# Patient Record
Sex: MALE | Race: WHITE | NOT HISPANIC OR LATINO | Employment: FULL TIME | URBAN - METROPOLITAN AREA
[De-identification: names, ages, dates, MRNs, and addresses within clinical notes are randomized per-mention and may not be internally consistent; named-entity substitution may affect disease eponyms.]

---

## 2017-01-04 DIAGNOSIS — E78.5 HYPERLIPIDEMIA: ICD-10-CM

## 2017-01-04 DIAGNOSIS — I25.10 ATHEROSCLEROTIC HEART DISEASE OF NATIVE CORONARY ARTERY WITHOUT ANGINA PECTORIS: ICD-10-CM

## 2017-01-04 DIAGNOSIS — I10 ESSENTIAL (PRIMARY) HYPERTENSION: ICD-10-CM

## 2017-01-04 DIAGNOSIS — Z12.5 ENCOUNTER FOR SCREENING FOR MALIGNANT NEOPLASM OF PROSTATE: ICD-10-CM

## 2017-01-04 DIAGNOSIS — R42 DIZZINESS AND GIDDINESS: ICD-10-CM

## 2017-01-10 ENCOUNTER — ALLSCRIPTS OFFICE VISIT (OUTPATIENT)
Dept: OTHER | Facility: OTHER | Age: 57
End: 2017-01-10

## 2017-01-11 ENCOUNTER — APPOINTMENT (OUTPATIENT)
Dept: LAB | Facility: MEDICAL CENTER | Age: 57
End: 2017-01-11
Payer: COMMERCIAL

## 2017-01-11 ENCOUNTER — TRANSCRIBE ORDERS (OUTPATIENT)
Dept: LAB | Facility: MEDICAL CENTER | Age: 57
End: 2017-01-11

## 2017-01-11 DIAGNOSIS — E78.5 HYPERLIPIDEMIA: ICD-10-CM

## 2017-01-11 DIAGNOSIS — Z12.5 ENCOUNTER FOR SCREENING FOR MALIGNANT NEOPLASM OF PROSTATE: ICD-10-CM

## 2017-01-11 DIAGNOSIS — I25.10 ATHEROSCLEROTIC HEART DISEASE OF NATIVE CORONARY ARTERY WITHOUT ANGINA PECTORIS: ICD-10-CM

## 2017-01-11 DIAGNOSIS — I10 ESSENTIAL (PRIMARY) HYPERTENSION: ICD-10-CM

## 2017-01-11 LAB
25(OH)D3 SERPL-MCNC: 23.3 NG/ML (ref 30–100)
ALBUMIN SERPL BCP-MCNC: 4.1 G/DL (ref 3.5–5)
ALP SERPL-CCNC: 75 U/L (ref 46–116)
ALT SERPL W P-5'-P-CCNC: 36 U/L (ref 12–78)
ANION GAP SERPL CALCULATED.3IONS-SCNC: 6 MMOL/L (ref 4–13)
AST SERPL W P-5'-P-CCNC: 23 U/L (ref 5–45)
BILIRUB SERPL-MCNC: 0.51 MG/DL (ref 0.2–1)
BUN SERPL-MCNC: 13 MG/DL (ref 5–25)
CALCIUM SERPL-MCNC: 9 MG/DL (ref 8.3–10.1)
CHLORIDE SERPL-SCNC: 105 MMOL/L (ref 100–108)
CHOLEST SERPL-MCNC: 143 MG/DL (ref 50–200)
CO2 SERPL-SCNC: 29 MMOL/L (ref 21–32)
CREAT SERPL-MCNC: 1.03 MG/DL (ref 0.6–1.3)
GFR SERPL CREATININE-BSD FRML MDRD: >60 ML/MIN/1.73SQ M
GLUCOSE SERPL-MCNC: 94 MG/DL (ref 65–140)
HDLC SERPL-MCNC: 45 MG/DL (ref 40–60)
LDLC SERPL CALC-MCNC: 73 MG/DL (ref 0–100)
MAGNESIUM SERPL-MCNC: 2.3 MG/DL (ref 1.6–2.6)
POTASSIUM SERPL-SCNC: 3.9 MMOL/L (ref 3.5–5.3)
PROT SERPL-MCNC: 7.4 G/DL (ref 6.4–8.2)
PSA SERPL-MCNC: 0.5 NG/ML (ref 0–4)
SODIUM SERPL-SCNC: 140 MMOL/L (ref 136–145)
TRIGL SERPL-MCNC: 127 MG/DL
TSH SERPL DL<=0.05 MIU/L-ACNC: 1.75 UIU/ML (ref 0.36–3.74)

## 2017-01-11 PROCEDURE — 82306 VITAMIN D 25 HYDROXY: CPT

## 2017-01-11 PROCEDURE — G0103 PSA SCREENING: HCPCS

## 2017-01-11 PROCEDURE — 36415 COLL VENOUS BLD VENIPUNCTURE: CPT

## 2017-01-11 PROCEDURE — 80053 COMPREHEN METABOLIC PANEL: CPT

## 2017-01-11 PROCEDURE — 80061 LIPID PANEL: CPT

## 2017-01-11 PROCEDURE — 84443 ASSAY THYROID STIM HORMONE: CPT

## 2017-01-11 PROCEDURE — 83735 ASSAY OF MAGNESIUM: CPT

## 2017-02-02 ENCOUNTER — HOSPITAL ENCOUNTER (OUTPATIENT)
Dept: ULTRASOUND IMAGING | Facility: HOSPITAL | Age: 57
Discharge: HOME | End: 2017-02-02
Attending: INTERNAL MEDICINE
Payer: COMMERCIAL

## 2017-02-02 ENCOUNTER — TRANSCRIBE ORDERS (OUTPATIENT)
Dept: ADMINISTRATIVE | Facility: HOSPITAL | Age: 57
End: 2017-02-02

## 2017-02-02 ENCOUNTER — ALLSCRIPTS OFFICE VISIT (OUTPATIENT)
Dept: OTHER | Facility: OTHER | Age: 57
End: 2017-02-02

## 2017-02-02 DIAGNOSIS — R42 DIZZINESS AND GIDDINESS: Primary | ICD-10-CM

## 2017-02-02 DIAGNOSIS — I25.10 ATHEROSCLEROTIC HEART DISEASE OF NATIVE CORONARY ARTERY WITHOUT ANGINA PECTORIS: ICD-10-CM

## 2017-02-02 PROCEDURE — 93880 EXTRACRANIAL BILAT STUDY: CPT

## 2017-02-17 ENCOUNTER — HOSPITAL ENCOUNTER (OUTPATIENT)
Dept: CT IMAGING | Facility: HOSPITAL | Age: 57
Discharge: HOME | End: 2017-02-17
Attending: INTERNAL MEDICINE
Payer: COMMERCIAL

## 2017-02-17 DIAGNOSIS — R42 DIZZINESS AND GIDDINESS: ICD-10-CM

## 2017-02-17 PROCEDURE — 70496 CT ANGIOGRAPHY HEAD: CPT

## 2017-02-17 PROCEDURE — 70498 CT ANGIOGRAPHY NECK: CPT

## 2017-02-17 RX ADMIN — IOHEXOL 100 ML: 350 INJECTION, SOLUTION INTRAVENOUS at 12:44

## 2017-07-18 ENCOUNTER — ALLSCRIPTS OFFICE VISIT (OUTPATIENT)
Dept: OTHER | Facility: OTHER | Age: 57
End: 2017-07-18

## 2017-07-18 DIAGNOSIS — I10 ESSENTIAL (PRIMARY) HYPERTENSION: ICD-10-CM

## 2017-07-18 DIAGNOSIS — E78.5 HYPERLIPIDEMIA: ICD-10-CM

## 2017-07-18 DIAGNOSIS — Z12.5 ENCOUNTER FOR SCREENING FOR MALIGNANT NEOPLASM OF PROSTATE: ICD-10-CM

## 2017-11-02 ENCOUNTER — ALLSCRIPTS OFFICE VISIT (OUTPATIENT)
Dept: OTHER | Facility: OTHER | Age: 57
End: 2017-11-02

## 2017-11-02 DIAGNOSIS — I83.90 ASYMPTOMATIC VARICOSE VEINS OF LOWER EXTREMITY: ICD-10-CM

## 2017-11-03 NOTE — PROGRESS NOTES
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)  3  Dermatitis (692 9) (L30 9)    Plan  Dermatitis    · Start: Nystatin-Triamcinolone 374410-3 9 UNIT/GM-% External Cream; APPLY TO AFFECTED AREA  TWICE A DAY  Varicosities of leg    · VAS REFLUX LOWER LIMB   ; Unilateral Side:Left; Status:Hold For - Scheduling; Requested  VJU:[id number];     Discussion/Summary  Discussion Summary:   Vascular study lle  Support stockings triamcinolone cream and moisturizer  Increase fluids, elevate left leg at rest 1        Medication SE Review and Pt Understands Tx: Possible side effects of new medications were reviewed with the patient/guardian today  The treatment plan was reviewed with the patient/guardian  The patient/guardian understands and agrees with the treatment plan       Self Referrals:   Self Referrals: No       1 Amended By: Lexy Dumont; Nov 02 2017 9:25 PM EST    Chief Complaint  Chief Complaint Free Text Note Form: Pt presents for f/up exam for Left ankle pain  Pt has c/o Left ankle pain, swelling and bruising for the past 2 months  Denies injury  No falls  NO refills needed today  appetite is normal per patient  Chief Complaint Chronic Condition [facility]: Patient is here today for follow up of chronic conditions described in HPI  History of Present Illness  HPI: Pt has redness and discoloration left ankle  Has varicose veins in back of leg  No pain  Some ankle pain but no trauma1        1 Amended By: Lexy Dumont; Nov 02 2017 9:22 PM EST    Review of Systems  Complete-Male:   Constitutional:1  No fever or chills, feels well, no tiredness, no recent weight gain or weight loss1 ,-- no fever1 -- and-- no chills1   Eyes:1  No complaints of eye pain, no red eyes, no discharge from eyes, no itchy eyes1   ENT:1  no complaints of earache, no hearing loss, no nosebleeds, no nasal discharge, no sore throat, no hoarseness1      Cardiovascular: 1  No complaints of slow heart rate, no fast heart rate, no chest pain, no palpitations, no leg claudication, no lower extremity1   Respiratory:1  No complaints of shortness of breath, no wheezing, no cough, no SOB on exertion, no orthopnea or PND1   Gastrointestinal:1  No complaints of abdominal pain, no constipation, no nausea or vomiting, no diarrhea or bloody stools1   Genitourinary:1  No complaints of dysuria, no incontinence, no hesitancy, no nocturia, no genital lesion, no testicular pain1   Musculoskeletal:1  joint swelling1 -- and-- joint stiffness1   Integumentary:1  dry skin1   Neurological:1  No compliants of headache, no confusion, no convulsions, no numbness or tingling, no dizziness or fainting, no limb weakness, no difficulty walking1 ,-- no numbness1 -- and-- no tingling1   Psychiatric:1  Is not suicidal, no sleep disturbances, no anxiety or depression, no change in personality, no emotional problems1   Endocrine:1  No complaints of proptosis, no hot flashes, no muscle weakness, no erectile dysfunction, no deepening of the voice, no feelings of weakness1   Hematologic/Lymphatic:1  No complaints of swollen glands, no swollen glands in the neck, does not bleed easily, no easy bruising1         1 Amended By: Dottie James; Nov 02 2017 9:22 PM EST    Active Problems  1  Arteriosclerosis of coronary artery (414 00) (I25 10)  2  Dyslipidemia (272 4) (E78 5)  3  Elbow pain, unspecified laterality (719 42) (M25 529)  4  Encounter for special screening examination for neoplasm of prostate (V76 44) (Z12 5)  5  Erectile dysfunction of non-organic origin (302 72) (F52 21)  6  GERD without esophagitis (530 81) (K21 9)  7  Hypertension (401 9) (I10)  8  Lightheadedness (780 4) (R42)  9  Near syncope (780 2) (R55)  10  Spondylosis of cervical region without myelopathy or radiculopathy (721 0) (F02 022)    Past Medical History  1  History of Acute Myocardial Infarction (V12 59)  2   History of low back pain (V13 59) (Z87 39)  3  History of Polyp of sigmoid colon (211 3) (D12 5)  4  History of Uncomplicated alcohol abuse (305 00) (F10 10)  Active Problems And Past Medical History Reviewed: The active problems and past medical history were reviewed and updated today  Surgical History  1  History of Cath Stent Placement  2  History of Inguinal Hernia Repair  Surgical History Reviewed: The surgical history was reviewed and updated today  Family History  Mother   1  Family history of Angina Pectoris  Father   2  Family history of Prostate Cancer (V16 42)  Maternal Grandmother   3  Family history of Angina Pectoris  Family History   4  Denied: Family history of Drug abuse  Family History Reviewed: The family history was reviewed and updated today  Social History   · Caffeine use (V49 89) (F15 90)   · Denied: History of Drug Use   · Former smoker (V15 82) (D02 938)   · Marital History - Currently    · Denied: Seeing a dentist   · Stopped Drinking Alcohol   · Uses Safety Equipment - Seatbelts  Social History Reviewed: The social history was reviewed and updated today  The social history was reviewed and is unchanged  Current Meds  1  Aspirin 81 MG TABS; TAKE 1 TABLET DAILY; Therapy: (Recorded:42Axe4734) to Recorded  2  Atorvastatin Calcium 40 MG Oral Tablet; Take 1 tablet daily; Therapy: 19ETH7235 to (Last Rx:08Eli3885)  Requested for: 49Vmh0001 Ordered  3  Cialis 20 MG Oral Tablet; TAKE 1 TABLET 1 HOUR BEFORE ACTIVITY AS NEEDED; Therapy: 66DSH4954 to (Evaluate:59Zjx8198); Last Rx:50Pwg4656 Ordered  4  Garlic CAPS; Therapy: (Recorded:19Jun2012) to Recorded  5  Metoprolol Succinate ER 50 MG Oral Tablet Extended Release 24 Hour; Take 1 tablet daily; Therapy: 23WGF0438 to (Last [facility])  Requested for: 61EMS1176 Ordered  6  Multiple Vitamin TABS; Therapy: (Recorded:19Jun2012) to Recorded  7  Niacin 500 MG Oral Tablet; TAKE 1 TABLET DAILY AS DIRECTED;    Therapy: (Eino Blend) to Recorded  8  Nitrostat 0 4 MG Sublingual Tablet Sublingual; PLACE 1 TABLET UNDER THE TONGUE EVERY 5   MINUTES FOR UP TO 3 DOSES AS NEEDED FOR CHEST PAIN  CALL 911 IF PAIN PERSISTS; Therapy: 54ZTV0922 to (Evaluate:18Apr2016)  Requested for: 10JHT5556; Last AO:26JNI7019   Ordered  9  RaNITidine HCl - 150 MG Oral Tablet; take 1 tablet twice a day; Therapy: 89EWL9508 to (Last Rx:70Oto0985)  Requested for: 20Ptr2990 Ordered  10  Vitamin C TABS; Therapy: (Recorded:19Jun2012) to Recorded  Medication List Reviewed: The medication list was reviewed and updated today  Allergies  1  Wellbutrin SR TBCR    Vitals  Vital Signs    Recorded: [id number] 04:40PM Recorded: 47FOF2704 04:15PM   Temperature  96 9 F, Tympanic   Heart Rate  78   Systolic 393    Diastolic 72    Height  5 ft 9 in   Weight  198 lb 8 oz   BMI Calculated  29 31   BSA Calculated  2 06   O2 Saturation  97, RA     Physical Exam    Constitutional1    General appearance: No acute distress, well appearing and well nourished  1    Eyes1    Conjunctiva and lids: No swelling, erythema, or discharge  1    Pupils and irises: Equal, round and reactive to light  1    Ears, Nose, Mouth, and Throat1    External inspection of ears and nose: Normal 1    Otoscopic examination: Tympanic membrance translucent with normal light reflex  Canals patent without erythema  1    Nasal mucosa, septum, and turbinates: Normal without edema or erythema  1    Oropharynx: Normal with no erythema, edema, exudate or lesions  1    Pulmonary1    Respiratory effort: No increased work of breathing or signs of respiratory distress  1    Auscultation of lungs: Clear to auscultation, equal breath sounds bilaterally, no wheezes, no rales, no rhonci  1    Cardiovascular1    Palpation of heart: Normal PMI, no thrills  1    Auscultation of heart: Normal rate and rhythm, normal S1 and S2, without murmurs  1    Examination of extremities for edema and/or varicosities: Abnormal  1 -- Left varicose veins and varicose pooling1   Carotid pulses: Normal 1    Abdomen1    Abdomen: Non-tender, no masses  1    Liver and spleen: No hepatomegaly or splenomegaly  1    Lymphatic1    Palpation of lymph nodes in neck: No lymphadenopathy  1    Musculoskeletal1    Gait and station: Normal 1    Digits and nails: Normal without clubbing or cyanosis  1    Inspection/palpation of joints, bones, and muscles: Normal 1    Skin   Skin and subcutaneous tissue: Abnormal  1 -- Dry skin lesion left ankle1   Neurologic1    Cranial nerves: Cranial nerves 2-12 intact  1    Reflexes: 2+ and symmetric  1    Sensation: Abnormal  1 -- 4/51   Psychiatric1    Orientation to person, place and time: Normal 1    Mood and affect: Normal 1          1 Amended By: Rc Mendosa; Nov 02 2017 9:24 PM EST    Health Management  History of Encounter for screening colonoscopy   Health Maintenance Flow Sheet; every 4 years; Last 49YYP3984; Next Due: 88Vzm8711;  Active    Signatures   Electronically signed by : Nayeli Mccabe DO; Nov 2 2017  4:44PM EST                       (Author)    Electronically signed by : Nayeli Mccabe DO; Nov 2 2017  9:25PM EST                       (Author)

## 2017-11-06 ENCOUNTER — TRANSCRIBE ORDERS (OUTPATIENT)
Dept: ADMINISTRATIVE | Facility: HOSPITAL | Age: 57
End: 2017-11-06

## 2017-11-06 ENCOUNTER — APPOINTMENT (OUTPATIENT)
Dept: LAB | Facility: HOSPITAL | Age: 57
End: 2017-11-06
Attending: INTERNAL MEDICINE
Payer: COMMERCIAL

## 2017-11-06 ENCOUNTER — HOSPITAL ENCOUNTER (OUTPATIENT)
Dept: ULTRASOUND IMAGING | Facility: HOSPITAL | Age: 57
Discharge: HOME | End: 2017-11-06
Attending: INTERNAL MEDICINE
Payer: COMMERCIAL

## 2017-11-06 DIAGNOSIS — E78.5 HYPERLIPIDEMIA: ICD-10-CM

## 2017-11-06 DIAGNOSIS — I83.90 ASYMPTOMATIC VARICOSE VEINS OF LOWER EXTREMITY: ICD-10-CM

## 2017-11-06 DIAGNOSIS — I10 ESSENTIAL (PRIMARY) HYPERTENSION: ICD-10-CM

## 2017-11-06 DIAGNOSIS — Z12.5 ENCOUNTER FOR SCREENING FOR MALIGNANT NEOPLASM OF PROSTATE: ICD-10-CM

## 2017-11-06 LAB
ALBUMIN SERPL BCP-MCNC: 3.9 G/DL (ref 3.5–5)
ALP SERPL-CCNC: 68 U/L (ref 46–116)
ALT SERPL W P-5'-P-CCNC: 47 U/L (ref 12–78)
ANION GAP SERPL CALCULATED.3IONS-SCNC: 7 MMOL/L (ref 4–13)
AST SERPL W P-5'-P-CCNC: 29 U/L (ref 5–45)
BILIRUB SERPL-MCNC: 0.6 MG/DL (ref 0.2–1)
BUN SERPL-MCNC: 15 MG/DL (ref 5–25)
CALCIUM SERPL-MCNC: 9.1 MG/DL (ref 8.3–10.1)
CHLORIDE SERPL-SCNC: 104 MMOL/L (ref 100–108)
CHOLEST SERPL-MCNC: 139 MG/DL (ref 50–200)
CO2 SERPL-SCNC: 31 MMOL/L (ref 21–32)
CREAT SERPL-MCNC: 1.03 MG/DL (ref 0.6–1.3)
GFR SERPL CREATININE-BSD FRML MDRD: 79 ML/MIN/1.73SQ M
GLUCOSE P FAST SERPL-MCNC: 105 MG/DL (ref 65–99)
HDLC SERPL-MCNC: 50 MG/DL (ref 40–60)
LDLC SERPL CALC-MCNC: 65 MG/DL (ref 0–100)
POTASSIUM SERPL-SCNC: 4.2 MMOL/L (ref 3.5–5.3)
PROT SERPL-MCNC: 6.6 G/DL (ref 6.4–8.2)
PSA SERPL-MCNC: 0.6 NG/ML (ref 0–4)
SODIUM SERPL-SCNC: 142 MMOL/L (ref 136–145)
TRIGL SERPL-MCNC: 118 MG/DL

## 2017-11-06 PROCEDURE — 80053 COMPREHEN METABOLIC PANEL: CPT

## 2017-11-06 PROCEDURE — 80061 LIPID PANEL: CPT

## 2017-11-06 PROCEDURE — 93971 EXTREMITY STUDY: CPT

## 2017-11-06 PROCEDURE — 36415 COLL VENOUS BLD VENIPUNCTURE: CPT

## 2017-11-06 PROCEDURE — G0103 PSA SCREENING: HCPCS

## 2018-01-12 NOTE — PROGRESS NOTES
Assessment  Assessed    1  CAD (coronary artery disease) (414 00) (I25 10)   2  History of Cath Stent Placement   3  Hypertension (401 9) (I10)   4  Dyslipidemia (272 4) (E78 5)    Plan  CAD (coronary artery disease), Dyslipidemia, Hypertension    · Follow-up visit in 1 year Evaluation and Treatment  Follow-up  Status: Hold For -  Scheduling  Requested for: 26Jan2016   Ordered; For: CAD (coronary artery disease), Dyslipidemia, Hypertension; Ordered By: Carlos Mcdonald Performed:  Due: 30YOC3856    Discussion/Summary  Discussion Summary:   Mr German Amaya is a pleasant 57-year-old male who presents to the office for routine follow-up  Since hospital discharge, he has been feeling well  He is asymptomatic and without complaints today  His blood pressure is well-controlled in the office today  His lipids drawn in December are acceptable on current therapy  No changes were made to his medication regimen  No testing is indicated given his lack of symptoms  His recent nuclear stress test was relatively unrevealing  I will see him back in the office in one year  History of Present Illness  HPI: Mr German Amaya is a pleasant 57-year-old male who presents to the office today for routine follow-up  Since his last visit, he has been feeling well  However he was hospitalized in December with atypical chest pain  He underwent a stress test during which he exercised for 12 minutes without any symptoms with nuclear perfusion imaging which was relatively unrevealing  He continues to dance regularly performing various styles of dancing  He does so without chest pain or shortness of breath  He denies symptoms of heart failure  He denies lightheadedness, syncope or presyncope  He denies symptoms of palpitations or claudication  Review of Systems  Cardiology Male ROS:     Cardiac: as noted in HPI  Skin: No complaints of nonhealing sores or skin rash     Genitourinary: No complaints of recurrent urinary tract infections, frequent urination at night, difficult urination, blood in urine, kidney stones, loss of bladder control, no kidney or prostate problems, no erectile dysfunction  Psychological: No complaints of feeling depressed, anxiety, panic attacks, or difficulty concentrating  General: No complaints of trouble sleeping, lack of energy, fatigue, appetite changes, weight changes, fever, frequent infections, or night sweats  Respiratory: No complaints of shortness of breath, cough with sputum, or wheezing  HEENT: No complaints of serious problems, hearing problems, nose problems, throat problems, or snoring  Gastrointestinal: No complaints of liver problems, nausea, vomiting, heartburn, constipation, bloody stools, diarrhea, problems swallowing, adbominal pain, or rectal bleeding  Hematologic: No complaints of bleeding disorders, anemia, blood clots, or excessive brusing  Neurological: No complaints of numbness, tingling, dizziness, weakness, seizures, headaches, syncope or fainting, AM fatigue, daytime sleepiness, no witnessed apnea episodes  Musculoskeletal: No complaints of arthritis, back pain, or painfull swelling  Active Problems  Problems    1  CAD (coronary artery disease) (414 00) (I25 10)   2  Dyslipidemia (272 4) (E78 5)   3  Elbow pain, unspecified laterality (719 42) (M25 529)   4  Erectile dysfunction of non-organic origin (302 72) (F52 21)   5  GERD without esophagitis (530 81) (K21 9)   6  Hypertension (401 9) (I10)   7  Spondylosis of cervical region without myelopathy or radiculopathy (721 0) (X10 341)    Past Medical History  Problems    1  History of Acute bronchitis (466 0) (J20 9)   2  History of Acute Myocardial Infarction (V12 59)   3  History of Encounter for prostate cancer screening (V76 44) (Z12 5)   4  History of Encounter for screening colonoscopy (V76 51) (Z12 11)   5  History of low back pain (V13 59) (Z87 39)   6  History of Polyp of sigmoid colon (211 3) (D12 5)   7   History of Uncomplicated alcohol abuse (305 00) (F10 10)  Active Problems And Past Medical History Reviewed: The active problems and past medical history were reviewed and updated today  Surgical History  Problems    1  History of Cath Stent Placement   2  History of Inguinal Hernia Repair  Surgical History Reviewed: The surgical history was reviewed and updated today  Family History  Mother    1  Family history of Angina Pectoris  Father    2  Family history of Prostate Cancer (V16 42)  Maternal Grandmother    3  Family history of Angina Pectoris  Family History Reviewed: The family history was reviewed and updated today  Social History  Problems    · Denied: History of Drug Use   · Former smoker (V15 82) (Q11 783)   · Marital History - Currently    · Stopped Drinking Alcohol   · Uses Safety Equipment - Seatbelts  Social History Reviewed: The social history was reviewed and updated today  Current Meds   1  Aspirin 81 MG Oral Tablet; TAKE 1 TABLET DAILY; Therapy: (Recorded:62Dro6558) to Recorded   2  Atorvastatin Calcium 40 MG Oral Tablet; Take 1 tablet daily; Therapy: 13HHX7729 to (Last Jailene Doon)  Requested for: 71Aaf0426 Ordered   3  Cialis 20 MG Oral Tablet; TAKE 1 TABLET 1 HOUR BEFORE ACTIVITY AS NEEDED; Therapy: 75PYR5216 to (Evaluate:93Wav8571); Last Rx:66Fcy3994 Ordered   4  Garlic CAPS; Therapy: (Recorded:19Jun2012) to Recorded   5  Metoprolol Succinate ER 50 MG Oral Tablet Extended Release 24 Hour; Take 1 tablet   daily; Therapy: 58PGC6878 to (Last Jailene Doon)  Requested for: 24Aug2015 Ordered   6  Multiple Vitamin TABS; Therapy: (Recorded:19Jun2012) to Recorded   7  Niacin 500 MG Oral Tablet; TAKE 1 TABLET DAILY AS DIRECTED; Therapy: (Recorded:88Byi3209) to Recorded   8  Nitrostat 0 4 MG Sublingual Tablet Sublingual; PLACE 1 TABLET UNDER THE TONGUE   EVERY 5 MINUTES FOR UP TO 3 DOSES AS NEEDED FOR CHEST PAIN  CALL   911 IF PAIN PERSISTS;    Therapy: 86CSE4961 to (Evaluate:18Apr2016)  Requested for: 56HEO7787; Last   JM:94WSD9068 Ordered   9  Ranitidine HCl - 150 MG Oral Tablet; take one tablet by mouth twice daily; Therapy: 64ULY6819 to (Evaluate:13Oct2016)  Requested for: 93KJT7742; Last   Rx:17Jan2016 Ordered   10  Vitamin C TABS; Therapy: (Recorded:19Jun2012) to Recorded  Medication List Reviewed: The medication list was reviewed and updated today  Allergies  Medication    1  Wellbutrin SR TBCR    Vitals  Vital Signs [Data Includes: Current Encounter]    Recorded: 66VTM9281 12:23PM Recorded: 26OKE0633 12:11PM   Heart Rate  86   Systolic 059 667, LUE, Sitting   Diastolic 80 66, LUE, Sitting   Height  5 ft 9 in   Weight  201 lb 5 oz   BMI Calculated  29 73   BSA Calculated  2 07     Physical Exam    Constitutional   General appearance: No acute distress, well appearing and well nourished  Eyes   Conjunctiva and Sclera examination: Conjunctiva pink, sclera anicteric  Ears, Nose, Mouth, and Throat - Oropharynx: Clear, nares are clear, mucous membranes are moist    Neck   Neck and thyroid: Normal, supple, trachea midline, no thyromegaly  Pulmonary   Respiratory effort: No increased work of breathing or signs of respiratory distress  Auscultation of lungs: Clear to auscultation, no rales, no rhonchi, no wheezing, good air movement  Cardiovascular   Palpation of heart: Normal PMI, no thrills  Auscultation of heart: Normal rate and rhythm, normal S1 and S2, no murmurs  Carotid pulses: Normal, 2+ bilaterally  Peripheral vascular exam: Normal pulses throughout, no tenderness, erythema or swelling  Pedal pulses: Normal, 2+ bilaterally  Examination of extremities for edema and/or varicosities: Normal     Abdomen   Abdomen: Non-tender and no distention  Liver and spleen: No hepatomegaly or splenomegaly  Musculoskeletal Gait and station: Normal gait  Digits and nails: Normal without clubbing or cyanosis   Inspection/palpation of joints, bones, and muscles: Normal, ROM normal     Skin - Skin and subcutaneous tissue: Normal without rashes or lesions  Skin is warm and well perfused, normal turgor  Neurologic - Cranial nerves: II - XII intact  Psychiatric - Orientation to person, place, and time: Normal  Mood and affect: Normal       Results/Data  Diagnostic Studies Reviewed Cardio:   Lab Review: 11/2014 - Total cholesterol 134, HDL 37, Triglycerides 118, LDL 73  Health Management  History of Encounter for prostate cancer screening   (1) PSA (SCREEN) (Dx V76 44 Screen for Prostate Cancer); every 1 year; Last 22QVK2846; Next  Due: 67Cyh3155; Active  History of Encounter for screening colonoscopy   Health Maintenance Flow Sheet; every 4 years; Last 02UFU3273; Next Due: 88Toj9908;  Active    Future Appointments    Date/Time Provider Specialty Site   03/29/2016 04:15 PM Kristin Bonds DO Internal Medicine [address] [facility] INTERNAL MEDICINE     Signatures   Electronically signed by : mAbrosio Felix DO; Jan 26 2016 12:31PM EST                       (Author)

## 2018-01-13 VITALS
SYSTOLIC BLOOD PRESSURE: 124 MMHG | WEIGHT: 198.5 LBS | TEMPERATURE: 96.9 F | BODY MASS INDEX: 29.4 KG/M2 | HEART RATE: 78 BPM | HEIGHT: 69 IN | OXYGEN SATURATION: 97 % | DIASTOLIC BLOOD PRESSURE: 72 MMHG

## 2018-01-13 VITALS
HEIGHT: 69 IN | BODY MASS INDEX: 30.07 KG/M2 | WEIGHT: 203 LBS | HEART RATE: 76 BPM | DIASTOLIC BLOOD PRESSURE: 70 MMHG | SYSTOLIC BLOOD PRESSURE: 118 MMHG

## 2018-01-14 VITALS
WEIGHT: 198.38 LBS | HEIGHT: 69 IN | BODY MASS INDEX: 29.38 KG/M2 | TEMPERATURE: 98 F | HEART RATE: 78 BPM | DIASTOLIC BLOOD PRESSURE: 70 MMHG | SYSTOLIC BLOOD PRESSURE: 122 MMHG | OXYGEN SATURATION: 94 %

## 2018-01-14 VITALS
HEIGHT: 69 IN | BODY MASS INDEX: 30.57 KG/M2 | DIASTOLIC BLOOD PRESSURE: 78 MMHG | SYSTOLIC BLOOD PRESSURE: 126 MMHG | TEMPERATURE: 97.5 F | WEIGHT: 206.38 LBS

## 2018-01-18 NOTE — CONSULTS
I had the pleasure of evaluating your patient, Celeste Genao  My full evaluation follows:      History of Present Illness  Mr Patito Puente is a pleasant 59-year-old male who presents to the office today for routine follow-up  Since his last visit, he has been feeling well  However he was hospitalized in December with atypical chest pain  He underwent a stress test during which he exercised for 12 minutes without any symptoms with nuclear perfusion imaging which was relatively unrevealing  He continues to dance regularly performing various styles of dancing  He does so without chest pain or shortness of breath  He denies symptoms of heart failure  He denies lightheadedness, syncope or presyncope  He denies symptoms of palpitations or claudication  Review of Systems      Cardiac: as noted in HPI  Skin: No complaints of nonhealing sores or skin rash  Genitourinary: No complaints of recurrent urinary tract infections, frequent urination at night, difficult urination, blood in urine, kidney stones, loss of bladder control, no kidney or prostate problems, no erectile dysfunction  Psychological: No complaints of feeling depressed, anxiety, panic attacks, or difficulty concentrating  General: No complaints of trouble sleeping, lack of energy, fatigue, appetite changes, weight changes, fever, frequent infections, or night sweats  Respiratory: No complaints of shortness of breath, cough with sputum, or wheezing  HEENT: No complaints of serious problems, hearing problems, nose problems, throat problems, or snoring  Gastrointestinal: No complaints of liver problems, nausea, vomiting, heartburn, constipation, bloody stools, diarrhea, problems swallowing, adbominal pain, or rectal bleeding  Hematologic: No complaints of bleeding disorders, anemia, blood clots, or excessive brusing     Neurological: No complaints of numbness, tingling, dizziness, weakness, seizures, headaches, syncope or fainting, AM fatigue, daytime sleepiness, no witnessed apnea episodes  Musculoskeletal: No complaints of arthritis, back pain, or painfull swelling  Active Problems    1  CAD (coronary artery disease) (414 00) (I25 10)   2  Dyslipidemia (272 4) (E78 5)   3  Elbow pain, unspecified laterality (719 42) (M25 529)   4  Erectile dysfunction of non-organic origin (302 72) (F52 21)   5  GERD without esophagitis (530 81) (K21 9)   6  Hypertension (401 9) (I10)   7  Spondylosis of cervical region without myelopathy or radiculopathy (721 0) (M47 812)    Past Medical History    · History of Acute bronchitis (466 0) (J20 9)   · History of Acute Myocardial Infarction (V12 59)   · History of Encounter for prostate cancer screening (V76 44) (Z12 5)   · History of Encounter for screening colonoscopy (V76 51) (Z12 11)   · History of low back pain (V13 59) (Z87 39)   · History of Polyp of sigmoid colon (211 3) (D12 5)   · History of Uncomplicated alcohol abuse (305 00) (F10 10)    The active problems and past medical history were reviewed and updated today  Surgical History    · History of Cath Stent Placement   · History of Inguinal Hernia Repair    The surgical history was reviewed and updated today  Family History    · Family history of Angina Pectoris    · Family history of Prostate Cancer (V16 42)    · Family history of Angina Pectoris    The family history was reviewed and updated today  Social History    · Denied: History of Drug Use   · Former smoker (V15 82) (E42 950)   · Marital History - Currently    · Stopped Drinking Alcohol   · Uses Safety Equipment - Seatbelts  The social history was reviewed and updated today  Current Meds   1  Aspirin 81 MG Oral Tablet; TAKE 1 TABLET DAILY; Therapy: (Recorded:37Gmg3606) to Recorded   2  Atorvastatin Calcium 40 MG Oral Tablet; Take 1 tablet daily; Therapy: 94PIA7747 to (Last Pamla Ashlee)  Requested for: 20Cch1027 Ordered   3   Cialis 20 MG Oral Tablet; TAKE 1 TABLET 1 HOUR BEFORE ACTIVITY AS NEEDED; Therapy: 30DVU3563 to (Evaluate:81Ahh0383); Last Rx:86Tab3636 Ordered   4  Garlic CAPS; Therapy: (Recorded:19Jun2012) to Recorded   5  Metoprolol Succinate ER 50 MG Oral Tablet Extended Release 24 Hour; Take 1 tablet   daily; Therapy: 14NIR5503 to (Last Bala Mackey)  Requested for: 22Usj4501 Ordered   6  Multiple Vitamin TABS; Therapy: (Recorded:19Jun2012) to Recorded   7  Niacin 500 MG Oral Tablet; TAKE 1 TABLET DAILY AS DIRECTED; Therapy: (Recorded:10Hkw7392) to Recorded   8  Nitrostat 0 4 MG Sublingual Tablet Sublingual; PLACE 1 TABLET UNDER THE TONGUE   EVERY 5 MINUTES FOR UP TO 3 DOSES AS NEEDED FOR CHEST PAIN  CALL   911 IF PAIN PERSISTS; Therapy: 48KNT2290 to (Evaluate:18Apr2016)  Requested for: 40GRP8344; Last   HD:31JXU8885 Ordered   9  Ranitidine HCl - 150 MG Oral Tablet; take one tablet by mouth twice daily; Therapy: 93FOO6230 to (Evaluate:13Oct2016)  Requested for: 33SPZ9194; Last   Rx:21Lng9613 Ordered   10  Vitamin C TABS; Therapy: (Recorded:19Jun2012) to Recorded    The medication list was reviewed and updated today  Allergies    1  Wellbutrin SR TBCR    Vitals   Recorded: 92TBG7231 12:23PM Recorded: 64CYK1585 12:11PM   Heart Rate  86   Systolic 434 101, LUE, Sitting   Diastolic 80 66, LUE, Sitting   Height  5 ft 9 in   Weight  201 lb 5 oz   BMI Calculated  29 73   BSA Calculated  2 07     Physical Exam    Constitutional   General appearance: No acute distress, well appearing and well nourished  Eyes   Conjunctiva and Sclera examination: Conjunctiva pink, sclera anicteric  Ears, Nose, Mouth, and Throat - Oropharynx: Clear, nares are clear, mucous membranes are moist    Neck   Neck and thyroid: Normal, supple, trachea midline, no thyromegaly  Pulmonary   Respiratory effort: No increased work of breathing or signs of respiratory distress  Auscultation of lungs: Clear to auscultation, no rales, no rhonchi, no wheezing, good air movement  Cardiovascular   Palpation of heart: Normal PMI, no thrills  Auscultation of heart: Normal rate and rhythm, normal S1 and S2, no murmurs  Carotid pulses: Normal, 2+ bilaterally  Peripheral vascular exam: Normal pulses throughout, no tenderness, erythema or swelling  Pedal pulses: Normal, 2+ bilaterally  Examination of extremities for edema and/or varicosities: Normal     Abdomen   Abdomen: Non-tender and no distention  Liver and spleen: No hepatomegaly or splenomegaly  Musculoskeletal Gait and station: Normal gait  Digits and nails: Normal without clubbing or cyanosis  Inspection/palpation of joints, bones, and muscles: Normal, ROM normal     Skin - Skin and subcutaneous tissue: Normal without rashes or lesions  Skin is warm and well perfused, normal turgor  Neurologic - Cranial nerves: II - XII intact  Psychiatric - Orientation to person, place, and time: Normal  Mood and affect: Normal       Results/Data    Lab Review: 11/2014 - Total cholesterol 134, HDL 37, Triglycerides 118, LDL 73  Assessment    1  CAD (coronary artery disease) (414 00) (I25 10)   2  History of Cath Stent Placement   · STENT TO THE MID AND DISTAL RCA  S/P LISA PLACEMENT TO THE MID      CIRCUMFLEX AND PROXIMAL SECOND OBTUSE MARGINAL  3  Hypertension (401 9) (I10)   4  Dyslipidemia (272 4) (E78 5)    Plan  CAD (coronary artery disease), Dyslipidemia, Hypertension    · Follow-up visit in 1 year Evaluation and Treatment  Follow-up  Status: Hold For -  Scheduling  Requested for: 26Jan2016   Ordered; For: CAD (coronary artery disease), Dyslipidemia, Hypertension; Ordered By: Jorge Ramos Performed:  Due: 70ZCS5585    Discussion/Summary    Mr Kael Baptiste is a pleasant 58-year-old male who presents to the office for routine follow-up  Since hospital discharge, he has been feeling well  He is asymptomatic and without complaints today  His blood pressure is well-controlled in the office today   His lipids drawn in December are acceptable on current therapy  No changes were made to his medication regimen  No testing is indicated given his lack of symptoms  His recent nuclear stress test was relatively unrevealing  I will see him back in the office in one year  Thank you very much for allowing me to participate in the care of this patient  If you have any questions, please do not hesitate to contact me        Future Appointments    Signatures   Electronically signed by : German Baumann DO; Jan 26 2016 12:31PM EST                       (Author)

## 2018-01-24 NOTE — PROGRESS NOTES
Assessment   1  Former smoker (V15 82) (T08 528)  2  Near syncope (780 2) (R55)1   3  Arteriosclerosis of coronary artery (414 00) (I25 10)1   4  Dyslipidemia (272 4) (E78 5)1   5  Hypertension (401 9) (I10)1      1 Amended By: Thai Mendoza; Chago 10 2017 9:55 PM EST    Plan  Arteriosclerosis of coronary artery    · (1) MAGNESIUM; Status:Active; Requested QCD:[id number];    · VAS CAROTID COMPLETE STUDY; SIDE:Bilateral; Status:Hold For - Scheduling;  Requested LJJ:[id number]; Arteriosclerosis of coronary artery, Dyslipidemia, Hypertension    · (1) VITAMIN D 25-HYDROXY; Status:Active; Requested TFY:98UBL0265;    · Follow-up visit in 6 months Evaluation and Treatment  Follow-up  Status: Hold For -  Scheduling  Requested for: 15VVZ6215  Dyslipidemia    · (1) LIPID PANEL, FASTING; Status:Active; Requested PZT:01WCF0673;   Dyslipidemia, Hypertension    · (1) TSH; Status:Active; Requested LIW:42HZW7366;   Encounter for special screening examination for neoplasm of prostate    · (1) PSA (SCREEN) (Dx V76 44 Screen for Prostate Cancer); Status:Active - Retrospective  By Protocol Authorization; Requested [id number]:[id number]; Health Maintenance    · *VB-Depression Screening; Status:Complete - Retrospective By Protocol Authorization;    Done: 19ENT0724 03:51PM  Hypertension    · (1) COMPREHENSIVE METABOLIC PANEL; Status:Active; Requested ZDF:65HDA7719;   PMH: Encounter for prostate cancer screening    · (1) PSA (SCREEN) (Dx V76 44 Screen for Prostate Cancer) ; every 1 year; Last  18XHR2172; Status:Discontinued    Discussion/Summary  Impression:  health maintenance visit1  1   Currently, he  eats an adequate diet1  and  has an adequate exercise regimen1  1   Prostate cancer screening:  prostate cancer screening is current1  1   Testicular cancer screening:  monthly self testicular exam was advised1  1   Colorectal cancer screening:  colorectal cancer screening is current1  1   The  immunizations are up to date1  1    Advice and education were given regarding  nutrition1  1   Patient discussion:  discussed with the patient1  1   Patient to get fbw and has upcoming followup with Dr Matt Nieto  Carotid doppler ordered  Increase fluids especially at work  Orthostatic precautions1        The patient has the current Goals:1  Resolve recent sxs1  The patent has the current Barriers:1  Works in high geat environment at [employer]        Patient is able to Self-Care  The treatment plan was reviewed with the patient/guardian  The patient/guardian understands and agrees with the treatment plan1          Self Referrals: No       1 Amended By: Aydee López; Chago 10 2017 10:01 PM EST    Chief Complaint  Pt presents for annual well exam  Pt feels ok today  Appetite is good  No refills needed today  History of Present Illness  HM, Adult Male: The patient is being seen for a  health maintenance1  evaluation1   The last health maintenance visit was1  6 months month(s) ago1   General Health: The patient's health since the last visit is described [place]   He has regular dental visits1   He denies vision problems1   He denies hearing loss1   Immunizations status:1  up to date1   Lifestyle:1   He consumes a diverse and healthy diet1   He has weight concerns1   He exercises regularly1   He does not use tobacco1   He denies alcohol use1   He denies drug use1   Gained weight recently1   Reproductive health:1   The patient is not sexually active1   He denies erectile dysfunction1   Screening: Cancer screening reviewed and current1   Metabolic screening reviewed and current1   Risk screening reviewed and current1   HPI: Pt doing ok but gas episodes of weakness,dizziness he blames on heat at work  But sxs are intermittent and not always at work  No chest pain ,occasional sob  No syncope  No headache   No cough1        1 Amended By: Aydee López; Chago 10 2017 9:52 PM EST    Review of Systems    Constitutional:1  No fever or chills, feels well, no tiredness, no recent weight gain or weight loss1   Eyes:1  No complaints of eye pain, no red eyes, no discharge from eyes, no itchy eyes1   ENT:1  no complaints of earache, no hearing loss, no nosebleeds, no nasal discharge, no sore throat, no hoarseness1   Cardiovascular: 1  No complaints of slow heart rate, no fast heart rate, no chest pain, no palpitations, no leg claudication, no lower extremity1 , no chest pain1  and no palpitations1   Respiratory:1  no shortness of breath1   Gastrointestinal:1  No complaints of abdominal pain, no constipation, no nausea or vomiting, no diarrhea or bloody stools1   Genitourinary:1  No complaints of dysuria, no incontinence, no hesitancy, no nocturia, no genital lesion, no testicular pain1   Musculoskeletal:1  No complaints of arthralgia, no myalgias, no joint swelling or stiffness, no limb pain or swelling1   Integumentary:1  No complaints of skin rash or skin lesions, no itching, no skin wound, no dry skin1   Neurological:1  dizziness1 , but no headache1 , no confusion1  and no convulsions1   Psychiatric:1  Is not suicidal, no sleep disturbances, no anxiety or depression, no change in personality, no emotional problems1  and no sleep disturbances1   Endocrine:1  No complaints of proptosis, no hot flashes, no muscle weakness, no erectile dysfunction, no deepening of the voice, no feelings of weakness1   Hematologic/Lymphatic:1  No complaints of swollen glands, no swollen glands in the neck, does not bleed easily, no easy bruising1         1 Amended By: Enrico Coffman; Chago 10 2017 9:53 PM EST    Active Problems   1  Arteriosclerosis of coronary artery (414 00) (I25 10)  2  Dyslipidemia (272 4) (E78 5)  3  Elbow pain, unspecified laterality (719 42) (M25 529)  4  Encounter for special screening examination for neoplasm of prostate (V76 44) (Z12 5)  5  Erectile dysfunction of non-organic origin (302 72) (F52 21)  6   GERD without esophagitis (530 81) (K21 9)  7  Hypertension (401 9) (I10)  8  Infected cyst of skin (706 2) (L72 9,L08 9)  9  Spondylosis of cervical region without myelopathy or radiculopathy (721 0) (C87 596)    Past Medical History    · History of Acute bronchitis (466 0) (J20 9)   · History of Acute Myocardial Infarction (V12 59)   · History of Encounter for prostate cancer screening (V76 44) (Z12 5)   · History of Encounter for screening colonoscopy (V76 51) (Z12 11)   · History of low back pain (V13 59) (Z87 39)   · History of Polyp of sigmoid colon (211 3) (D12 5)   · History of Uncomplicated alcohol abuse (305 00) (F10 10)    Surgical History    · History of Cath Stent Placement   · History of Inguinal Hernia Repair    Family History  Mother    · Family history of Angina Pectoris  Father    · Family history of Prostate Cancer (V16 42)  Maternal Grandmother    · Family history of Angina Pectoris  Family History    · Denied: Family history of Drug abuse    Social History    · Caffeine use (V49 89) (F15 90)   · Denied: History of Drug Use   · Former smoker (V15 82) (Z87 891)   · Marital History - Currently    · Denied: Seeing a dentist   · Stopped Drinking Alcohol   · Uses Safety Equipment - Seatbelts    Current Meds  1  Aspirin 81 MG TABS; TAKE 1 TABLET DAILY; Therapy: (Recorded:06Zjg9911) to Recorded  2  Atorvastatin Calcium 40 MG Oral Tablet; Take 1 tablet daily; Therapy: 16KKW8979 to (Last O[place])  Requested for: 28Jun2016 Ordered  3  Cialis 20 MG Oral Tablet; TAKE 1 TABLET 1 HOUR BEFORE ACTIVITY AS NEEDED; Therapy: 71WYB6238 to (Evaluate:83Jwl5357); Last Rx:33Jrk2052 Ordered  4  Garlic CAPS; Therapy: (Recorded:19Jun2012) to Recorded  5  Metoprolol Succinate ER 50 MG Oral Tablet Extended Release 24 Hour; Take 1 tablet   daily; Therapy: 46CZQ2696 to (Last Erika [facility] Teck)  Requested for: 28Jun2016 Ordered  6  Multiple Vitamin TABS; Therapy: (Recorded:19Jun2012) to Recorded  7   Niacin 500 MG Oral Tablet; TAKE 1 TABLET DAILY AS DIRECTED; Therapy: (Recorded:94Slt6656) to Recorded  8  Nitrostat 0 4 MG Sublingual Tablet Sublingual; PLACE 1 TABLET UNDER THE TONGUE   EVERY 5 MINUTES FOR UP TO 3 DOSES AS NEEDED FOR CHEST PAIN  CALL   911 IF PAIN PERSISTS; Therapy: 93HOJ3813 to (Evaluate:18Apr2016)  Requested for: 13MZW6230; Last   BH:97VFR4846 Ordered  9  RaNITidine HCl - 150 MG Oral Tablet; take one tablet by mouth twice daily; Therapy: 14RYA2712 to (Evaluate:13Nov2016)  Requested for: 55UCO1328; Last   Rx:51Ivg9125 Ordered  10  Vitamin C TABS; Therapy: (Recorded:19Jun2012) to Recorded    Allergies   1  Wellbutrin SR TBCR    Vitals   Recorded: [id number] 09:53PM Recorded: 80KWU2199 03:45PM   Temperature 1 49 3 F   Systolic 4576 1   Diastolic 123 1   Height 1 5 ft 9 in   Weight 1 206 lb 6 08 oz   BMI Calculated 1 30 48   BSA Calculated 1 2 09       1  Amended By: Ailyn Baig; Chago 10 2017 9:53 PM EST   Physical Exam    Constitutional1    General appearance: No acute distress, well appearing and well nourished  1    Head and Face1    Head and face: Normal 1    Palpation of the face and sinuses: No sinus tenderness  1    Eyes1    Conjunctiva and lids: No erythema, swelling or discharge  1    Pupils and irises: Equal, round, reactive to light  1    Ophthalmoscopic examination: Normal fundi and optic discs  1    Ears, Nose, Mouth, and Throat1    External inspection of ears and nose: Normal 1    Otoscopic examination: Tympanic membranes translucent with normal light reflex  Canals patent without erythema  1    Hearing: Normal 1    Nasal mucosa, septum, and turbinates: Normal without edema or erythema  1    Lips, teeth, and gums: Normal, good dentition  1    Oropharynx: Normal with no erythema, edema, exudate or lesions  1    Neck1    Neck: Supple, symmetric, trachea midline, no masses  1    Thyroid: Normal, no thyromegaly  1    Pulmonary1    Respiratory effort: No increased work of breathing or signs of respiratory distress  1    Percussion of chest: Normal 1    Palpation of chest: Normal 1    Auscultation of lungs: Clear to auscultation  1    Cardiovascular1    Palpation of heart: Normal PMI, no thrills  1    Auscultation of heart: Normal rate and rhythm, normal S1 and S2, no murmurs  1    Carotid pulses: 2+ bilaterally  1    Abdominal aorta: Normal 1    Femoral pulses: 2+ bilaterally  1    Pedal pulses: 2+ bilaterally  1    Peripheral vascular exam: Normal 1    Examination of extremities for edema and/or varicosities: Normal 1    Abdomen1    Abdomen: Non-tender, no masses  1    Liver and spleen: No hepatomegaly or splenomegaly  1    Examination for hernias: No hernias appreciated  1    Lymphatic1    Palpation of lymph nodes in neck: No lymphadenopathy  1    Palpation of lymph nodes in axillae: No lymphadenopathy  1    Palpation of lymph nodes in groin: No lymphadenopathy  1    Palpation of lymph nodes in other areas: No lymphadenopathy  1    Musculoskeletal1    Gait and station: Normal 1    Inspection/palpation of digits and nails: Normal without clubbing or cyanosis  1    Inspection/palpation of joints, bones, and muscles: Normal 1    Range of motion: Normal 1    Stability: Normal 1    Muscle strength/tone: Normal 1    Skin1    Skin and subcutaneous tissue: Normal without rashes or lesions  1    Palpation of skin and subcutaneous tissue: Normal turgor  1    Neurologic1    Cranial nerves: Cranial nerves 2-12 intact  1    Cortical function: Normal mental status  1    Reflexes: 2+ and symmetric  1    Sensation: No sensory loss  1    Coordination: Normal finger to nose and heel to shin  1    Psychiatric1    Judgment and insight: Normal 1    Orientation to person, place and time: Normal 1    Recent and remote memory: Intact  1    Mood and affect: Normal 1        1 Amended By: Rc Mendosa; Chago 10 2017 9:55 PM EST    Results/Data  PHQ-2 Adult Depression Screening 56NVA5124 03:51PM User, s     Test Name Result Flag Reference   PHQ-2 Adult Depression Score 0     Over the last two weeks, how often have you been bothered by any of the following problems? Little interest or pleasure in doing things: Not at all - 0  Feeling down, depressed, or hopeless: Not at all - 0   PHQ-2 Adult Depression Screening Negative       *VB-Depression Screening 45DQF0323 03:51PM Vicky Larson     Test Name Result Flag Reference   Depression Scale Result      Depression Screen - Negative For Symptoms       Health Management  History of Encounter for screening colonoscopy   Health Maintenance Flow Sheet; every 4 years; Last 59GZP2627; Next Due: 88Wuc9789;   Active    Future Appointments    Date/Time Provider Specialty Site   02/02/2017 03:40 PM Levis Meckel, DO Cardiology [facility] [address]     Signatures   Electronically signed by : Shan Rossi DO; Chgao 10 2017 10:01PM EST                       (Author)

## 2018-02-15 ENCOUNTER — OFFICE VISIT (OUTPATIENT)
Dept: CARDIOLOGY CLINIC | Facility: HOSPITAL | Age: 58
End: 2018-02-15
Payer: COMMERCIAL

## 2018-02-15 VITALS
BODY MASS INDEX: 30.21 KG/M2 | HEIGHT: 69 IN | DIASTOLIC BLOOD PRESSURE: 66 MMHG | HEART RATE: 80 BPM | SYSTOLIC BLOOD PRESSURE: 118 MMHG | WEIGHT: 204 LBS

## 2018-02-15 DIAGNOSIS — Z95.5 PRESENCE OF DRUG COATED STENT IN LEFT CIRCUMFLEX CORONARY ARTERY: ICD-10-CM

## 2018-02-15 DIAGNOSIS — Z95.5 PRESENCE OF BARE METAL STENT IN RIGHT CORONARY ARTERY: ICD-10-CM

## 2018-02-15 DIAGNOSIS — E78.5 DYSLIPIDEMIA: ICD-10-CM

## 2018-02-15 DIAGNOSIS — I10 ESSENTIAL HYPERTENSION: ICD-10-CM

## 2018-02-15 DIAGNOSIS — I25.10 CORONARY ARTERY DISEASE INVOLVING NATIVE CORONARY ARTERY OF NATIVE HEART WITHOUT ANGINA PECTORIS: Primary | ICD-10-CM

## 2018-02-15 PROCEDURE — 99214 OFFICE O/P EST MOD 30 MIN: CPT | Performed by: INTERNAL MEDICINE

## 2018-02-15 RX ORDER — NITROGLYCERIN 0.4 MG/1
1 TABLET SUBLINGUAL
COMMUNITY
Start: 2011-09-14 | End: 2020-06-07

## 2018-02-15 RX ORDER — ATORVASTATIN CALCIUM 40 MG/1
1 TABLET, FILM COATED ORAL DAILY
COMMUNITY
Start: 2012-03-20 | End: 2018-10-25 | Stop reason: SDUPTHER

## 2018-02-15 RX ORDER — METOPROLOL SUCCINATE 50 MG/1
1 TABLET, EXTENDED RELEASE ORAL DAILY
COMMUNITY
Start: 2011-09-26 | End: 2018-08-26 | Stop reason: SDUPTHER

## 2018-02-15 RX ORDER — NIACIN 500 MG
1 TABLET ORAL DAILY
COMMUNITY
End: 2021-07-23 | Stop reason: ALTCHOICE

## 2018-02-15 RX ORDER — DIPHENHYDRAMINE HCL 25 MG
25 TABLET ORAL DAILY
COMMUNITY
End: 2018-03-13 | Stop reason: ALTCHOICE

## 2018-02-15 RX ORDER — RANITIDINE 150 MG/1
1 TABLET ORAL 2 TIMES DAILY
COMMUNITY
Start: 2014-11-17 | End: 2018-04-07 | Stop reason: SDUPTHER

## 2018-02-15 RX ORDER — RIBOFLAVIN (VITAMIN B2) 100 MG
TABLET ORAL
COMMUNITY

## 2018-02-15 RX ORDER — TADALAFIL 20 MG/1
1 TABLET ORAL
COMMUNITY
Start: 2015-02-18

## 2018-02-15 NOTE — PROGRESS NOTES
Cardiology Follow Up    Abram Haque  1960  [id number]  [address] [place] [address]    1  Coronary artery disease involving native coronary artery of native heart without angina pectoris     2  Presence of bare metal stent in right coronary artery     3  Presence of drug coated stent in left circumflex coronary artery     4  Essential hypertension     5  Dyslipidemia         Discussion/Summary:  Mr Roxana Gusman is a pleasant 15-year-old male who presents to the office for routine follow-up  His blood pressure is well-controlled in the office today  His lipids drawn in November 2017 are acceptable on current therapy  No changes were made to his medication regimen  He continues to remain active and asymptomatic  No further cardiac testing is advised  He will return to the office in one year or sooner if deemed necessary  Interval History:  Mr Roxana Gusman is a pleasant 15-year-old male who presents to the office today for routine follow-up  He continues to dance regularly performing various styles of dancing  He does so one to two times per week  He does so without chest pain or shortness of breath  He denies symptoms of heart failure including increasing lower extremity edema, paroxysmal nocturnal dyspnea, orthopnea, acute weight gain or increasing abdominal girth  He had reported lightheadedness during his last visit  This has resolved  He denies syncope or presyncope  He denies symptoms of palpitations or claudication  Problem List     Hypertension    Coronary artery disease    Presence of bare metal stent in right coronary artery    Presence of drug coated stent in left circumflex coronary artery    Dyslipidemia        No past medical history on file    Social History     Social History    Marital status:      Spouse name: N/A    Number of children: N/A    Years of education: N/A Occupational History    Not on file  Social History Main Topics    Smoking status: Former Smoker    Smokeless tobacco: Never Used    Alcohol use No    Drug use: No    Sexual activity: Not on file     Other Topics Concern    Not on file     Social History Narrative    No narrative on file      No family history on file  No past surgical history on file      Current Outpatient Prescriptions:     Ascorbic Acid (VITAMIN C) 100 MG tablet, Take by mouth, Disp: , Rfl:     aspirin 81 MG tablet, Take 1 tablet by mouth daily, Disp: , Rfl:     atorvastatin (LIPITOR) 40 mg tablet, Take 1 tablet by mouth daily, Disp: , Rfl:     diphenhydrAMINE (BENADRYL) 25 mg tablet, Take 25 mg by mouth daily, Disp: , Rfl:     Garlic 10 MG CAPS, Take by mouth, Disp: , Rfl:     metoprolol succinate (TOPROL-XL) 50 mg 24 hr tablet, Take 1 tablet by mouth daily, Disp: , Rfl:     Multiple Vitamin-Folic Acid TABS, Take by mouth, Disp: , Rfl:     niacin 500 mg tablet, Take 1 tablet by mouth daily, Disp: , Rfl:     nitroglycerin (NITROSTAT) 0 4 mg SL tablet, Place 1 tablet under the tongue every 5 (five) minutes as needed, Disp: , Rfl:     ranitidine (ZANTAC) 150 mg tablet, Take 1 tablet by mouth 2 (two) times a day, Disp: , Rfl:     tadalafil (CIALIS) 20 MG tablet, Take 1 tablet by mouth, Disp: , Rfl:   Allergies   Allergen Reactions    Wellbutrin Sr  [Bupropion]        Labs:     Chemistry        Component Value Date/Time     11/06/2017 0714     12/14/2015 1035    K 4 2 11/06/2017 0714    K 3 7 12/14/2015 1035     11/06/2017 0714     12/14/2015 1035    CO2 31 11/06/2017 0714    CO2 29 3 12/14/2015 1035    BUN 15 11/06/2017 0714    BUN 17 12/14/2015 1035    CREATININE 1 03 11/06/2017 0714    CREATININE 1 13 12/14/2015 1035        Component Value Date/Time    CALCIUM 9 1 11/06/2017 0714    CALCIUM 8 5 12/14/2015 1035    ALKPHOS 68 11/06/2017 0714    ALKPHOS 67 12/14/2015 1035    AST 29 11/06/2017 0714 AST 31 12/14/2015 1035    ALT 47 11/06/2017 0714    ALT 43 12/14/2015 1035    BILITOT 0 60 11/06/2017 0714    BILITOT 0 54 12/14/2015 1035            Lab Results   Component Value Date    CHOL 139 11/06/2017    CHOL 143 01/11/2017    CHOL 138 12/15/2015     Lab Results   Component Value Date    HDL 50 11/06/2017    HDL 45 01/11/2017    HDL 47 12/15/2015     Lab Results   Component Value Date    LDLCALC 65 11/06/2017    LDLCALC 73 01/11/2017    LDLCALC 64 12/15/2015     Lab Results   Component Value Date    TRIG 118 11/06/2017    TRIG 127 01/11/2017    TRIG 136 12/15/2015     No components found for: CHOLHDL    Imaging: No results found  ECG:  Normal sinus rhythm, normal ECG      Review of Systems   Cardiovascular: Negative for chest pain, dyspnea on exertion and irregular heartbeat  All other systems reviewed and are negative  Vitals:    02/15/18 1312   BP: 118/66   Pulse: 80     Vitals:    02/15/18 1312   Weight: 92 5 kg (204 lb)     Height: 5' 9" (175 3 cm)   Body mass index is 30 13 kg/m²      Physical Exam:   General appearance:  Appears stated age, alert, well appearing and in no distress  HEENT:  PERRLA, EOMI, no scleral icterus, no conjunctival pallor  NECK:  Supple, No elevated JVP, no thyromegaly, no carotid bruits  HEART:  Regular rate and rhythm, normal S1/S2, no S3/S4, no murmur or rub  LUNGS:  Clear to auscultation bilaterally  ABDOMEN:  Soft, non-tender, positive bowel sounds, no rebound or guarding, no organomegaly   EXTREMITIES:  No edema  VASCULAR:  Normal pedal pulses   SKIN: No lesions or rashes on exposed skin  NEURO:  CN II-XII intact, no focal deficits

## 2018-03-13 ENCOUNTER — OFFICE VISIT (OUTPATIENT)
Dept: INTERNAL MEDICINE CLINIC | Facility: CLINIC | Age: 58
End: 2018-03-13
Payer: COMMERCIAL

## 2018-03-13 VITALS
HEART RATE: 76 BPM | BODY MASS INDEX: 30.72 KG/M2 | SYSTOLIC BLOOD PRESSURE: 122 MMHG | DIASTOLIC BLOOD PRESSURE: 68 MMHG | WEIGHT: 207.38 LBS | TEMPERATURE: 97.5 F | HEIGHT: 69 IN | OXYGEN SATURATION: 97 %

## 2018-03-13 DIAGNOSIS — I25.10 CORONARY ARTERY DISEASE INVOLVING NATIVE CORONARY ARTERY OF NATIVE HEART WITHOUT ANGINA PECTORIS: ICD-10-CM

## 2018-03-13 DIAGNOSIS — I10 ESSENTIAL HYPERTENSION: ICD-10-CM

## 2018-03-13 DIAGNOSIS — E78.5 DYSLIPIDEMIA: ICD-10-CM

## 2018-03-13 DIAGNOSIS — K63.5 POLYP OF COLON, UNSPECIFIED PART OF COLON, UNSPECIFIED TYPE: Primary | ICD-10-CM

## 2018-03-13 PROBLEM — I83.90 VARICOSITIES OF LEG: Status: ACTIVE | Noted: 2017-11-02

## 2018-03-13 PROBLEM — L30.9 DERMATITIS: Status: ACTIVE | Noted: 2017-11-02

## 2018-03-13 PROCEDURE — 99214 OFFICE O/P EST MOD 30 MIN: CPT | Performed by: INTERNAL MEDICINE

## 2018-03-13 RX ORDER — NITROGLYCERIN 0.4 MG/1
0.4 TABLET SUBLINGUAL
Qty: 10 TABLET | Refills: 0 | Status: SHIPPED | OUTPATIENT
Start: 2018-03-13 | End: 2018-09-18 | Stop reason: ALTCHOICE

## 2018-03-13 NOTE — PROGRESS NOTES
Assessment/Plan:         Diagnoses and all orders for this visit:    Coronary artery disease involving native coronary artery of native heart without angina pectoris  Pt will work on losing weight - advised 10pound weight loss  Cardio eval reviewed and stable from 2/18    Essential hypertension  No changes, BP stable  No added salt,low fat diet    Dyslipidemia  Last cholesterol profile 11/17 stable  Rto 6 months     Patient ID: Marcos Mills is a 59 y o  male  HPI   Pt doing ok overall  Taking benadryl at bedtime and it has helped a bit  Saw cardio last month and has been stable  No chest pain or sob  No dizziness or neck sxs  No recent illnesses  Due for colon screen - last done 4 years ago  The following portions of the patient's history were reviewed and updated as appropriate:     Review of Systems   Constitutional: Negative  HENT: Negative  Eyes: Negative  Respiratory: Negative  Cardiovascular: Negative  Gastrointestinal: Negative  Endocrine: Negative  Genitourinary: Negative  Musculoskeletal: Negative  Skin: Negative  Allergic/Immunologic: Negative  Neurological: Negative  Hematological: Negative  Psychiatric/Behavioral: Negative  /68   Pulse 76   Temp 97 5 °F (36 4 °C) (Tympanic)   Ht 5' 9" (1 753 m)   Wt 94 1 kg (207 lb 6 oz)   SpO2 97%   BMI 30 62 kg/m²      Physical Exam   Constitutional: He is oriented to person, place, and time  He appears well-developed and well-nourished  No distress  HENT:   Head: Normocephalic and atraumatic  Right Ear: External ear normal    Left Ear: External ear normal    Nose: Nose normal    Mouth/Throat: Oropharynx is clear and moist  No oropharyngeal exudate  Eyes: Conjunctivae and EOM are normal  Pupils are equal, round, and reactive to light  No scleral icterus  Neck: Normal range of motion  Neck supple  No JVD present     Cardiovascular: Normal rate, regular rhythm, normal heart sounds and intact distal pulses  No murmur heard  Pulmonary/Chest: Effort normal and breath sounds normal  No respiratory distress  He has no wheezes  He has no rales  He exhibits no tenderness  Abdominal: Soft  Bowel sounds are normal  He exhibits no distension  There is no tenderness  There is no rebound and no guarding  Musculoskeletal: Normal range of motion  He exhibits no edema, tenderness or deformity  Lymphadenopathy:     He has no cervical adenopathy  Neurological: He is alert and oriented to person, place, and time  He has normal reflexes  He displays normal reflexes  No cranial nerve deficit  He exhibits normal muscle tone  Coordination normal    Skin: Skin is warm and dry  No rash noted  He is not diaphoretic  No erythema  No pallor  Psychiatric: He has a normal mood and affect  His behavior is normal  Judgment and thought content normal    Nursing note and vitals reviewed

## 2018-04-02 ENCOUNTER — APPOINTMENT (OUTPATIENT)
Dept: LAB | Facility: HOSPITAL | Age: 58
End: 2018-04-02
Attending: INTERNAL MEDICINE
Payer: COMMERCIAL

## 2018-04-02 ENCOUNTER — TELEPHONE (OUTPATIENT)
Dept: INTERNAL MEDICINE CLINIC | Facility: CLINIC | Age: 58
End: 2018-04-02

## 2018-04-02 DIAGNOSIS — R39.15 URINARY URGENCY: Primary | ICD-10-CM

## 2018-04-02 DIAGNOSIS — IMO0002 URINARY COMPLICATION: Primary | ICD-10-CM

## 2018-04-02 DIAGNOSIS — N39.0 URINARY TRACT INFECTION WITHOUT HEMATURIA, SITE UNSPECIFIED: Primary | ICD-10-CM

## 2018-04-02 LAB
BILIRUB UR QL STRIP: NEGATIVE
CLARITY UR: CLEAR
COLOR UR: YELLOW
GLUCOSE UR STRIP-MCNC: NEGATIVE MG/DL
HGB UR QL STRIP.AUTO: NEGATIVE
KETONES UR STRIP-MCNC: NEGATIVE MG/DL
LEUKOCYTE ESTERASE UR QL STRIP: NEGATIVE
NITRITE UR QL STRIP: NEGATIVE
PH UR STRIP.AUTO: 5.5 [PH] (ref 4.5–8)
PROT UR STRIP-MCNC: NEGATIVE MG/DL
SP GR UR STRIP.AUTO: >=1.03 (ref 1–1.03)
UROBILINOGEN UR QL STRIP.AUTO: 0.2 E.U./DL

## 2018-04-02 PROCEDURE — 81003 URINALYSIS AUTO W/O SCOPE: CPT

## 2018-04-02 RX ORDER — CIPROFLOXACIN 500 MG/1
500 TABLET, FILM COATED ORAL EVERY 12 HOURS SCHEDULED
Qty: 10 TABLET | Refills: 0 | Status: SHIPPED | OUTPATIENT
Start: 2018-04-02 | End: 2018-04-07

## 2018-04-07 DIAGNOSIS — K21.9 GASTROESOPHAGEAL REFLUX DISEASE WITHOUT ESOPHAGITIS: Primary | ICD-10-CM

## 2018-04-08 RX ORDER — RANITIDINE 150 MG/1
TABLET ORAL
Qty: 180 TABLET | Refills: 2 | Status: SHIPPED | OUTPATIENT
Start: 2018-04-08 | End: 2018-09-18 | Stop reason: ALTCHOICE

## 2018-05-16 ENCOUNTER — OFFICE VISIT (OUTPATIENT)
Dept: GASTROENTEROLOGY | Facility: HOSPITAL | Age: 58
End: 2018-05-16
Attending: INTERNAL MEDICINE
Payer: COMMERCIAL

## 2018-05-16 VITALS
SYSTOLIC BLOOD PRESSURE: 119 MMHG | BODY MASS INDEX: 29.62 KG/M2 | TEMPERATURE: 98.9 F | HEIGHT: 69 IN | DIASTOLIC BLOOD PRESSURE: 83 MMHG | HEART RATE: 66 BPM | WEIGHT: 200 LBS

## 2018-05-16 DIAGNOSIS — K21.9 GERD WITHOUT ESOPHAGITIS: Primary | ICD-10-CM

## 2018-05-16 DIAGNOSIS — K63.5 POLYP OF SIGMOID COLON, UNSPECIFIED TYPE: ICD-10-CM

## 2018-05-16 DIAGNOSIS — K63.5 POLYP OF COLON, UNSPECIFIED PART OF COLON, UNSPECIFIED TYPE: ICD-10-CM

## 2018-05-16 PROCEDURE — 99243 OFF/OP CNSLTJ NEW/EST LOW 30: CPT | Performed by: INTERNAL MEDICINE

## 2018-05-16 RX ORDER — SODIUM, POTASSIUM,MAG SULFATES 17.5-3.13G
1 SOLUTION, RECONSTITUTED, ORAL ORAL ONCE
Qty: 2 BOTTLE | Refills: 0 | Status: SHIPPED | OUTPATIENT
Start: 2018-05-16 | End: 2018-09-12

## 2018-05-16 NOTE — PROGRESS NOTES
Assessment/Plan:    Polyp of sigmoid colon  1  Plan for repeat colonoscopy now  GERD without esophagitis  1  On Zantac  2  EGD  3  He does give a history of a hiatal hernia but has never had an EGD done  Diagnoses and all orders for this visit:    GERD without esophagitis    Polyp of colon, unspecified part of colon, unspecified type  -     Ambulatory referral to Gastroenterology    Polyp of sigmoid colon, unspecified type          Subjective:      Patient ID: Sabine Handley is a 59 y o  male  HPI     He has had colonoscopies done in the past and showed polyps - the last colonoscopy was 4 years ago and another polyp was seen (previous to that 4 years ago)  He has no diarrhea/ constipation or rectal bleeding  He takes metamucil  He has no heartburn or reflux  He takes ranitidine  He has a history of a hiatal hernia but has never had an EGD done  No dysphagia  No weight loss  The following portions of the patient's history were reviewed and updated as appropriate: allergies, current medications, past family history, past medical history, past social history, past surgical history and problem list     Review of Systems   Constitutional: Negative  Negative for fever  HENT: Negative  Eyes: Negative  Respiratory: Negative  Cardiovascular: Negative  Gastrointestinal: Negative for constipation, diarrhea, nausea and vomiting  See HPI   Endocrine: Negative  Genitourinary: Positive for dysuria  Negative for frequency and hematuria  Musculoskeletal: Negative  Negative for arthralgias and myalgias  Skin: Negative  Allergic/Immunologic: Negative  Neurological: Negative  Negative for headaches  Hematological: Negative  Psychiatric/Behavioral: Negative  All other systems reviewed and are negative          Objective:      /83 (BP Location: Left arm, Patient Position: Sitting, Cuff Size: Adult)   Pulse 66   Temp 98 9 °F (37 2 °C) (Tympanic)   Ht 5' 9" (1 753 m)   Wt 90 7 kg (200 lb) Comment: 2  BMI 29 53 kg/m²          Physical Exam   Constitutional: He is oriented to person, place, and time  Vital signs are normal  He appears well-developed and well-nourished  HENT:   Head: Normocephalic and atraumatic  Eyes: Conjunctivae are normal  Pupils are equal, round, and reactive to light  No scleral icterus  Neck: Normal range of motion  Cardiovascular: Normal rate, regular rhythm and normal heart sounds  Pulmonary/Chest: Effort normal and breath sounds normal  No respiratory distress  Abdominal: Soft  Normal appearance and bowel sounds are normal  He exhibits no distension, no ascites and no mass  There is no hepatosplenomegaly  There is no tenderness  No hernia  Musculoskeletal: Normal range of motion  Lymphadenopathy:     He has no cervical adenopathy  Neurological: He is alert and oriented to person, place, and time  Skin: Skin is warm  Psychiatric: He has a normal mood and affect   His behavior is normal  Thought content normal

## 2018-05-17 ENCOUNTER — OFFICE VISIT (OUTPATIENT)
Dept: UROLOGY | Facility: CLINIC | Age: 58
End: 2018-05-17
Payer: COMMERCIAL

## 2018-05-17 VITALS
SYSTOLIC BLOOD PRESSURE: 118 MMHG | BODY MASS INDEX: 29.62 KG/M2 | HEART RATE: 76 BPM | RESPIRATION RATE: 20 BRPM | WEIGHT: 200 LBS | DIASTOLIC BLOOD PRESSURE: 70 MMHG | HEIGHT: 69 IN

## 2018-05-17 DIAGNOSIS — F52.21 ERECTILE DYSFUNCTION OF NON-ORGANIC ORIGIN: Primary | ICD-10-CM

## 2018-05-17 DIAGNOSIS — IMO0002 URINARY COMPLICATION: ICD-10-CM

## 2018-05-17 DIAGNOSIS — R30.0 DYSURIA: ICD-10-CM

## 2018-05-17 LAB — POST-VOID RESIDUAL VOLUME, ML POC: 212 ML

## 2018-05-17 PROCEDURE — 87591 N.GONORRHOEAE DNA AMP PROB: CPT | Performed by: UROLOGY

## 2018-05-17 PROCEDURE — 99244 OFF/OP CNSLTJ NEW/EST MOD 40: CPT | Performed by: UROLOGY

## 2018-05-17 PROCEDURE — 51798 US URINE CAPACITY MEASURE: CPT | Performed by: UROLOGY

## 2018-05-17 PROCEDURE — 87491 CHLMYD TRACH DNA AMP PROBE: CPT | Performed by: UROLOGY

## 2018-05-17 NOTE — PROGRESS NOTES
UROLOGY NEW CONSULT NOTE     CHIEF COMPLAINT   Jorge Padgett is a 59 y o  male with a complaint of   Chief Complaint   Patient presents with    Difficulty Urinating       History of Present Illness:     59 y o  male who had unprotected intercourse approximately 2 months ago  Following this episode, he developed burning and irritation with urination  He had penicillin from a prior oral surgery which he took and then proceeded to his primary care doctor  A urine culture was sent and the patient was given an additional round of antibiotics  He has still had some intermittent discomfort  Patient does have a history of gonorrhea 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  There is a questionable prostate cancer history in his father  He has had routine PSA testing but not had a digital rectal exam since he was in the service  Lab Results   Component Value Date    PSA 0 6 11/06/2017    PSA 0 5 01/11/2017    PSA 0 5 08/13/2015     Past Medical History:     Past Medical History:   Diagnosis Date    Acute myocardial infarction ([facility] [address] )     Dysuria     Polyp of sigmoid colon     Uncomplicated alcohol abuse        PAST SURGICAL HISTORY:     Past Surgical History:   Procedure Laterality Date    CORONARY ANGIOPLASTY WITH STENT PLACEMENT      stent to the mid and distal RCA   S/P rick placement to the mid circumflex and proximal second obtuse marginal; Last Assessed: 2/2/2017    INGUINAL HERNIA REPAIR         CURRENT MEDICATIONS:     Current Outpatient Prescriptions   Medication Sig Dispense Refill    Ascorbic Acid (VITAMIN C) 100 MG tablet Take by mouth      aspirin 81 MG tablet Take 1 tablet by mouth daily      atorvastatin (LIPITOR) 40 mg tablet Take 1 tablet by mouth daily      Garlic 10 MG CAPS Take by mouth      metoprolol succinate (TOPROL-XL) 50 mg 24 hr tablet Take 1 tablet by mouth daily      Multiple Vitamin-Folic Acid TABS Take by mouth      niacin 500 mg tablet Take 1 tablet by mouth daily      nitroglycerin (NITROSTAT) 0 4 mg SL tablet Place 1 tablet under the tongue every 5 (five) minutes as needed      ranitidine (ZANTAC) 150 mg tablet TAKE 1 TABLET TWICE A  tablet 2    tadalafil (CIALIS) 20 MG tablet Take 1 tablet by mouth      nitroglycerin (NITROSTAT) 0 4 mg SL tablet Place 1 tablet (0 4 mg total) under the tongue every 5 (five) minutes as needed for chest pain for up to 10 days 10 tablet 0    nystatin-triamcinolone (MYCOLOG-II) cream Apply topically 2 (two) times a day      SUPREP BOWEL PREP KIT 17 5-3 13-1 6 GM/180ML SOLN Take 1 kit by mouth once for 1 dose Please follow instructions as per the office  2 Bottle 0     No current facility-administered medications for this visit  ALLERGIES:     Allergies   Allergen Reactions    Wellbutrin Sr  [Bupropion]        SOCIAL HISTORY:     Social History     Social History    Marital status:      Spouse name: N/A    Number of children: N/A    Years of education: N/A     Social History Main Topics    Smoking status: Former Smoker     Types: Cigarettes    Smokeless tobacco: Never Used    Alcohol use No    Drug use: No    Sexual activity: Not Asked     Other Topics Concern    None     Social History Narrative    Caffeine use    Currently  per [employer]    Denied: seeing a dentist    Uses safety equipment - seatbelts           SOCIAL HISTORY:     Family History   Problem Relation Age of Onset    Angina Mother     Prostate cancer Father     Angina Maternal Grandmother        REVIEW OF SYSTEMS:     Review of Systems   Constitutional: Negative  Respiratory: Negative  Cardiovascular: Negative  Gastrointestinal: Negative  Genitourinary: Positive for dysuria, penile pain and urgency  Musculoskeletal: Negative  Skin: Negative  Neurological: Negative  Psychiatric/Behavioral: Negative            PHYSICAL EXAM:     /70   Pulse 76   Resp 20   Ht 5' 9" (1 753 m)   Wt 90 7 kg (200 lb)   BMI 29 53 kg/m²     General:  Healthy appearing male in no acute distress  They have a normal affect  There is not appear to be any gross neurologic defects or abnormalities  HEENT:  Normocephalic, atraumatic  Neck is supple without any palpable lymphadenopathy  Cardiovascular:  Patient has normal palpable distal radial pulses  There is no significant peripheral edema  No JVD is noted  Respiratory:  Patient has unlabored respirations  There is no audible wheeze or rhonchi  Abdomen:  Abdomen is without surgical scars  Abdomen is soft and nontender  There is no tympany  Inguinal and umbilical hernia are not appreciated  Genitourinary: Circumcised phallus, testicles descended and orthotopic without nodules  Patient requested deferring digital rectal exam today instead preferring to perform during cystoscopy    Musculoskeletal:  Patient does not have significant CVA tenderness in the  flank with palpation or percussion  They full range of motion in all 4 extremities  Strength in all 4 extremities appears congruent  Patient is able to ambulate without assistance or difficulty  Dermatologic:  Patient has no skin abnormalities or rashes  LABS:     CBC:   Lab Results   Component Value Date    WBC 5 29 12/15/2015    HGB 14 0 12/15/2015    HCT 41 5 12/15/2015    MCV 87 12/15/2015     (L) 12/15/2015       BMP:   Lab Results   Component Value Date    GLUCOSE 94 01/11/2017    CALCIUM 9 1 11/06/2017     11/06/2017    K 4 2 11/06/2017    CO2 31 11/06/2017     11/06/2017    BUN 15 11/06/2017    CREATININE 1 03 11/06/2017     Lab Results   Component Value Date    PSA 0 6 11/06/2017    PSA 0 5 01/11/2017    PSA 0 5 08/13/2015     UA w Reflex to Microscopic w Reflex to Culture -Lab Collect   Order: [id number]   Status:  Final result   Visible to patient:  Yes (MyChart)   Next appt:   Today at 09:00 AM in Urology Dong Egan MD)   Dx:  Urinary urgency   Notes Recorded by Regla Munguia DO on 4/2/2018 at 5:14 PM EDT  Urine normal    Ref Range & Units 4/2/18  4:43 PM   Color, UA  Yellow    Clarity, UA  Clear    Specific [place], UA 1 003 - 1 030 >=1 030    pH, UA 4 5 - 8 0 5 5    Leukocytes, UA Negative Negative    Nitrite, UA Negative Negative    Protein, UA Negative mg/dl Negative    Glucose, UA Negative mg/dl Negative    Ketones, UA Negative mg/dl Negative    Urobilinogen, UA 0 2, 1 0 E U /dl E U /dl 0 2    Bilirubin, UA Negative Negative    Blood, UA Negative, Trace-Intact Negative       Specimen Collected: 04/02/18  4:43 PM Last Resulted: 04/02/18  4:51 PM              IMAGING:     No  imaGING    PROCEDURE:     Recent Results (from the past 2 hour(s))   POCT Measure PVR    Collection Time: 05/17/18  9:07 AM   Result Value Ref Range    POST-VOID RESIDUAL VOLUME, ML  mL        ASSESSMENT:     59 y o  male with dysuria questionably related to episode of unprotected intercourse    PLAN:     We will obtain a urine for GC chlamydia today  Given that the patient already received antibiotics, I will not empirically treat him unless the tests are positive  I did discuss the patient the likelihood of urethral disease including urethritis or potentially urethral strictures related to his prior episodes of sexually transmitted infections  I recommended we move forward with a cystourethroscopy  We will perform a digital rectal exam that day  The patient will not be due for updated PSA screening until November of 2018  I again reinforced the patient the importance of using protection during sexual intercourse to reduce the risk of sexually transmitted infections

## 2018-05-18 LAB
CHLAMYDIA DNA CVX QL NAA+PROBE: NORMAL
N GONORRHOEA DNA GENITAL QL NAA+PROBE: NORMAL

## 2018-05-21 ENCOUNTER — TELEPHONE (OUTPATIENT)
Dept: UROLOGY | Facility: CLINIC | Age: 58
End: 2018-05-21

## 2018-05-21 NOTE — TELEPHONE ENCOUNTER
Left message for pt to contact office      ----- Message from Alondra Dash MD sent at 5/21/2018  7:49 AM EDT -----  Please let patient know his STI testing was negative

## 2018-07-05 ENCOUNTER — ANESTHESIA EVENT (OUTPATIENT)
Dept: PERIOP | Facility: HOSPITAL | Age: 58
End: 2018-07-05
Payer: COMMERCIAL

## 2018-07-05 NOTE — ANESTHESIA PREPROCEDURE EVALUATION
Review of Systems/Medical History  Patient summary reviewed  Chart reviewed      Cardiovascular  Exercise tolerance (METS): >4,  Hypertension , Past MI > 6 months, CAD , Cardiac stents bare metal   Comment: 2011 cardiac stent,  Pulmonary  Smoker ex-smoker  ,        GI/Hepatic    GERD well controlled,             Endo/Other     GYN       Hematology   Musculoskeletal    Arthritis     Neurology   Psychology           Physical Exam    Airway    Mallampati score: III  TM Distance: >3 FB  Neck ROM: full     Dental   upper dentures and lower dentures,     Cardiovascular  Cardiovascular exam normal    Pulmonary  Pulmonary exam normal     Other Findings        Anesthesia Plan  ASA Score- 2     Anesthesia Type- IV sedation with anesthesia with ASA Monitors  Additional Monitors:   Airway Plan:         Plan Factors-    Induction- intravenous  Postoperative Plan-     Informed Consent- Anesthetic plan and risks discussed with patient

## 2018-07-06 ENCOUNTER — ANESTHESIA (OUTPATIENT)
Dept: PERIOP | Facility: HOSPITAL | Age: 58
End: 2018-07-06
Payer: COMMERCIAL

## 2018-07-06 ENCOUNTER — HOSPITAL ENCOUNTER (OUTPATIENT)
Facility: HOSPITAL | Age: 58
Setting detail: OUTPATIENT SURGERY
Discharge: HOME | End: 2018-07-06
Attending: INTERNAL MEDICINE | Admitting: INTERNAL MEDICINE
Payer: COMMERCIAL

## 2018-07-06 VITALS
SYSTOLIC BLOOD PRESSURE: 114 MMHG | DIASTOLIC BLOOD PRESSURE: 70 MMHG | HEART RATE: 74 BPM | RESPIRATION RATE: 20 BRPM | TEMPERATURE: 97 F | OXYGEN SATURATION: 97 %

## 2018-07-06 DIAGNOSIS — K21.9 GERD WITHOUT ESOPHAGITIS: ICD-10-CM

## 2018-07-06 DIAGNOSIS — K63.5 POLYP OF COLON, UNSPECIFIED PART OF COLON, UNSPECIFIED TYPE: ICD-10-CM

## 2018-07-06 DIAGNOSIS — K20.90 ESOPHAGITIS: Primary | ICD-10-CM

## 2018-07-06 PROCEDURE — 88305 TISSUE EXAM BY PATHOLOGIST: CPT | Performed by: PATHOLOGY

## 2018-07-06 PROCEDURE — 43239 EGD BIOPSY SINGLE/MULTIPLE: CPT | Performed by: INTERNAL MEDICINE

## 2018-07-06 PROCEDURE — 88342 IMHCHEM/IMCYTCHM 1ST ANTB: CPT | Performed by: PATHOLOGY

## 2018-07-06 PROCEDURE — 45380 COLONOSCOPY AND BIOPSY: CPT | Performed by: INTERNAL MEDICINE

## 2018-07-06 RX ORDER — SODIUM CHLORIDE, SODIUM LACTATE, POTASSIUM CHLORIDE, CALCIUM CHLORIDE 600; 310; 30; 20 MG/100ML; MG/100ML; MG/100ML; MG/100ML
125 INJECTION, SOLUTION INTRAVENOUS CONTINUOUS
Status: DISCONTINUED | OUTPATIENT
Start: 2018-07-06 | End: 2018-07-06

## 2018-07-06 RX ORDER — LIDOCAINE HYDROCHLORIDE 10 MG/ML
INJECTION, SOLUTION INFILTRATION; PERINEURAL AS NEEDED
Status: DISCONTINUED | OUTPATIENT
Start: 2018-07-06 | End: 2018-07-06 | Stop reason: SURG

## 2018-07-06 RX ORDER — PROPOFOL 10 MG/ML
INJECTION, EMULSION INTRAVENOUS AS NEEDED
Status: DISCONTINUED | OUTPATIENT
Start: 2018-07-06 | End: 2018-07-06 | Stop reason: SURG

## 2018-07-06 RX ORDER — OMEPRAZOLE 40 MG/1
40 CAPSULE, DELAYED RELEASE ORAL DAILY
Qty: 30 CAPSULE | Refills: 2 | Status: SHIPPED | OUTPATIENT
Start: 2018-07-06 | End: 2018-09-12 | Stop reason: SDUPTHER

## 2018-07-06 RX ADMIN — SODIUM CHLORIDE, SODIUM LACTATE, POTASSIUM CHLORIDE, AND CALCIUM CHLORIDE 125 ML/HR: .6; .31; .03; .02 INJECTION, SOLUTION INTRAVENOUS at 09:16

## 2018-07-06 RX ADMIN — PROPOFOL 50 MG: 10 INJECTION, EMULSION INTRAVENOUS at 10:23

## 2018-07-06 RX ADMIN — PROPOFOL 50 MG: 10 INJECTION, EMULSION INTRAVENOUS at 10:27

## 2018-07-06 RX ADMIN — PROPOFOL 50 MG: 10 INJECTION, EMULSION INTRAVENOUS at 10:15

## 2018-07-06 RX ADMIN — PROPOFOL 50 MG: 10 INJECTION, EMULSION INTRAVENOUS at 10:18

## 2018-07-06 RX ADMIN — PROPOFOL 50 MG: 10 INJECTION, EMULSION INTRAVENOUS at 10:31

## 2018-07-06 RX ADMIN — PROPOFOL 50 MG: 10 INJECTION, EMULSION INTRAVENOUS at 10:35

## 2018-07-06 RX ADMIN — PROPOFOL 50 MG: 10 INJECTION, EMULSION INTRAVENOUS at 10:20

## 2018-07-06 RX ADMIN — PROPOFOL 50 MG: 10 INJECTION, EMULSION INTRAVENOUS at 10:14

## 2018-07-06 RX ADMIN — LIDOCAINE HYDROCHLORIDE 50 MG: 10 INJECTION, SOLUTION INFILTRATION; PERINEURAL at 10:14

## 2018-07-06 NOTE — OP NOTE
**** GI/ENDOSCOPY REPORT ****     PATIENT NAME: Ashlie Velazquez ------ VISIT ID:     INTRODUCTION: Colonoscopy - A 59 male patient presents for an outpatient   Colonoscopy at [facility]  PREVIOUS COLONOSCOPY: Patient's last colonoscopy was 4 years ago  INDICATIONS: Screening for personal history of polyps  CONSENT:  The benefits, risks, and alternatives to the procedure were   discussed and informed consent was obtained from the patient  PREPARATION: EKG, pulse, pulse oximetry and blood pressure were monitored   throughout the procedure  The patient was identified by myself both   verbally and by visual inspection of ID band  ASA Classification: Class 2   - Patient has mild to moderate systemic disturbance that may or may not be   related to the disorder requiring surgery  MEDICATIONS: Anesthesia-check records     PROCEDURE:  The pediatric colonoscope was passed without difficulty   through the anus under direct visualization and advanced to the cecum,   confirmed by appendiceal orifice and ileocecal valve  The scope was   withdrawn and the mucosa was carefully examined  The views were good  The   patient's toleration of the procedure was good  Retroflexion was performed   in the rectum  The quality of the preparation was good  Cecal Intubation   Time: 3 minutes(s)  Scope Withdrawal Time: 11 minutes(s)     RECTAL EXAM: Normal rectal exam      FINDINGS:  A single diminutive polyp was found in the descending colon  The polyp was completely removed by cold biopsy polypectomy  The polyp was   retrieved  There was evidence of diverticulosis in the ascending colon and   sigmoid colon  Otherwise, the colon appeared to be normal      COMPLICATIONS: There were no complications  IMPRESSIONS: A single diminutive polyp found in the descending colon;   removed by cold biopsy polypectomy  Diverticulosis found in the ascending   colon and sigmoid colon       RECOMMENDATIONS: Follow-up on the results of the biopsy specimens 2 weeks  Colonoscopy recommended in 5 years  PATHOLOGY SPECIMENS: Completely removed by cold biopsy polypectomy  ESTIMATED BLOOD LOSS:     PROCEDURE CODES:     ICD-9 Codes: V12 72 Personal history of colonic polyps 211 3 Benign   neoplasm of colon 562 10 Diverticulosis of colon (without mention of   hemorrhage)     ICD-10 Codes: Z86 010 Personal history of colonic polyps K63 5 Polyp of   colon K57 Diverticular disease of intestine     PERFORMED BY: GEORGIANA Bautista  on 07/06/2018  Version 1, electronically signed by GEORGIANA Bautista  on 07/06/2018 at   10:48

## 2018-07-06 NOTE — H&P
History and Physical - [facility]  Alvaro So 59 y o  male MRN: [id number]    HPI: Alvaro So is a 59y o  year old male who presents with history of colon polyp (4 years ago) and GERD  Review of Systems    Historical Information   Past Medical History:   Diagnosis Date    Acute myocardial infarction ([facility] [address] )     Dysuria     Polyp of sigmoid colon     Uncomplicated alcohol abuse      Past Surgical History:   Procedure Laterality Date    CORONARY ANGIOPLASTY WITH STENT PLACEMENT      stent to the mid and distal RCA  S/P rick placement to the mid circumflex and proximal second obtuse marginal; Last Assessed: 2/2/2017    INGUINAL HERNIA REPAIR       Social History   History   Alcohol Use No     History   Drug Use No     History   Smoking Status    Former Smoker    Types: Cigarettes   Smokeless Tobacco    Never Used     Family History   Problem Relation Age of Onset    Angina Mother     Prostate cancer Father     Angina Maternal Grandmother        Meds/Allergies     Prescriptions Prior to Admission   Medication    Ascorbic Acid (VITAMIN C) 100 MG tablet    aspirin 81 MG tablet    atorvastatin (LIPITOR) 40 mg tablet    Garlic 10 MG CAPS    metoprolol succinate (TOPROL-XL) 50 mg 24 hr tablet    Multiple Vitamin-Folic Acid TABS    niacin 500 mg tablet    ranitidine (ZANTAC) 150 mg tablet    nitroglycerin (NITROSTAT) 0 4 mg SL tablet    nitroglycerin (NITROSTAT) 0 4 mg SL tablet    nystatin-triamcinolone (MYCOLOG-II) cream    SUPREP BOWEL PREP KIT 17 5-3 13-1 6 GM/180ML SOLN    tadalafil (CIALIS) 20 MG tablet       Allergies   Allergen Reactions    Wellbutrin Sr  [Bupropion]        Objective     Blood pressure 122/77, pulse 80, temperature (!) 96 °F (35 6 °C), resp  rate 18, SpO2 96 %        PHYSICAL EXAM    Gen: NAD  CV: RRR  CHEST: Clear  ABD: soft, NT/ND  EXT: no edema  Neuro: AAO      ASSESSMENT/PLAN:  This is a 59y o  year old male here for EGD for GERD and colonoscopy for h/o polyps  PLAN:   Procedure: EGD/ Colonoscopy

## 2018-07-06 NOTE — ANESTHESIA POSTPROCEDURE EVALUATION
Post-Op Assessment Note      CV Status:  Stable    Mental Status:  Somnolent    Hydration Status:  Stable    PONV Controlled:  None    Airway Patency:  Patent    Post Op Vitals Reviewed: Yes          Staff: CRNA           BP   119/73   Temp  96 8   Pulse  83   Resp   16   SpO2   93%

## 2018-07-06 NOTE — OP NOTE
**** GI/ENDOSCOPY REPORT ****     PATIENT NAME: Kulwinder Cartagena - VISIT ID:     INTRODUCTION: Esophagogastroduodenoscopy - A 59 male patient presents for   an outpatient Esophagogastroduodenoscopy at [facility]  INDICATIONS: GERD  CONSENT: The benefits, risks, and alternatives to the procedure were   discussed and informed consent was obtained from the patient  PREPARATION:  EKG, pulse, pulse oximetry and blood pressure were monitored   throughout the procedure  ASA Classification: Class 2 - Patient has mild   to moderate systemic disturbance that may or may not be related to the   disorder requiring surgery  MEDICATIONS: Anesthesia-check records     PROCEDURE:  The endoscope was passed without difficulty through the mouth   under direct visualization and advanced to the 2nd portion of the   duodenum  The scope was withdrawn and the mucosa was carefully examined  FINDINGS:   Esophagus: The proximal third of the esophagus and middle   third of the esophagus appeared to be normal   Erosive reflux-induced   esophagitis was found in the distal third of the esophagus and GE   junction  A tongue of Galindo's esophagus was found in the GE junction,   spanning 5 mm  Two forceps biopsies was taken  The GE junction was   observed 40 cm from the entry site  Stomach: Erosive gastritis was found   in the antrum  Otherwise, the stomach appeared to be normal  Two random   biopsies was taken from the antrum and body of the stomach  Duodenum: The   duodenal bulb and 2nd portion of the duodenum appeared to be normal      COMPLICATIONS: There were no complications  IMPRESSIONS: Esophagitis seen in the distal third of the esophagus and GE   junction  Possible short segment Galindo's esophagus noted  Two biopsies   taken  Erosive gastritis found in the antrum  Otherwise normal stomach  Biopsies done  Normal duodenal bulb and 2nd portion of the duodenum       RECOMMENDATIONS: Follow-up on the results of the biopsy specimens in 2   weeks  Begin taking omeprazole (Prilosec) 40 mg PO every day  Avoid all   non-steroidal anti-inflammatory drugs (NSAID's) including but not limited   to Ibuprofen, Advil, Motrin, and Nuprin  ESTIMATED BLOOD LOSS:     PATHOLOGY SPECIMENS: Two forceps biopsies taken  Associated finding:   Galindo's esophagus  Two random biopsies taken from the antrum and body of   the stomach  PROCEDURE CODES:     ICD-9 Codes: 530 81 Esophageal reflux 530 85 Galindo's esophagus 535 40   Other specified gastritides, without mention of hemorrhage     ICD-10 Codes: K21 Gastro-esophageal reflux disease K20 9 Esophagitis,   unspecified K22 7 Galindo's esophagus K29 Gastritis and duodenitis     PERFORMED BY: GEORGIANA Hoyos  on 07/06/2018  Version 1, electronically signed by GEORGIANA Hoyos  on 07/06/2018 at   10:25

## 2018-07-24 ENCOUNTER — TELEPHONE (OUTPATIENT)
Dept: GASTROENTEROLOGY | Facility: CLINIC | Age: 58
End: 2018-07-24

## 2018-08-15 NOTE — PROGRESS NOTES
UROLOGY NEW CONSULT NOTE     CHIEF COMPLAINT   Jorge Padgett is a 59 y o  male with a complaint of   Chief Complaint   Patient presents with    Cystoscopy       History of Present Illness:     59 y o  male who had unprotected intercourse approximately 2 months ago  Following this episode, he developed burning and irritation with urination  He had penicillin from a prior oral surgery which he took and then proceeded to his primary care doctor  A urine culture was sent and the patient was given an additional round of antibiotics  He has still had some intermittent discomfort  Patient does have a history of gonorrhea 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  There is a questionable prostate cancer history in his father  He has had routine PSA testing but not had a digital rectal exam since he was in the service  Lab Results   Component Value Date    PSA 0 6 11/06/2017    PSA 0 5 01/11/2017    PSA 0 5 08/13/2015     Patient underwent GC/chlamydia testing, which was negative  Returns with persistent dysuria for cystourethroscopy  His last episode of any urinary pain was approximately 3 weeks ago  He has not had any fevers or chills or blood in his urine  Past Medical History:     Past Medical History:   Diagnosis Date    Acute myocardial infarction ([address] )     Dysuria     Polyp of sigmoid colon     Uncomplicated alcohol abuse        PAST SURGICAL HISTORY:     Past Surgical History:   Procedure Laterality Date    CORONARY ANGIOPLASTY WITH STENT PLACEMENT      stent to the mid and distal RCA   S/P rick placement to the mid circumflex and proximal second obtuse marginal; Last Assessed: 2/2/2017    EGD AND COLONOSCOPY N/A 7/6/2018    Procedure: EGD AND COLONOSCOPY;  Surgeon: Shanta James MD;  Location: [facility];  Service: Gastroenterology    INGUINAL HERNIA REPAIR         CURRENT MEDICATIONS:     Current Outpatient Prescriptions   Medication Sig Dispense Refill    Ascorbic Acid (VITAMIN C) 100 MG tablet Take by mouth      aspirin 81 MG tablet Take 1 tablet by mouth daily      atorvastatin (LIPITOR) 40 mg tablet Take 1 tablet by mouth daily      Garlic 10 MG CAPS Take by mouth      metoprolol succinate (TOPROL-XL) 50 mg 24 hr tablet Take 1 tablet by mouth daily      Multiple Vitamin-Folic Acid TABS Take by mouth      niacin 500 mg tablet Take 1 tablet by mouth daily      nitroglycerin (NITROSTAT) 0 4 mg SL tablet Place 1 tablet under the tongue every 5 (five) minutes as needed      ranitidine (ZANTAC) 150 mg tablet TAKE 1 TABLET TWICE A  tablet 2    tadalafil (CIALIS) 20 MG tablet Take 1 tablet by mouth      nitroglycerin (NITROSTAT) 0 4 mg SL tablet Place 1 tablet (0 4 mg total) under the tongue every 5 (five) minutes as needed for chest pain for up to 10 days 10 tablet 0    nystatin-triamcinolone (MYCOLOG-II) cream Apply topically 2 (two) times a day      omeprazole (PriLOSEC) 40 MG capsule Take 1 capsule (40 mg total) by mouth daily for 30 days 30 capsule 2    SUPREP BOWEL PREP KIT 17 5-3 13-1 6 GM/180ML SOLN Take 1 kit by mouth once for 1 dose Please follow instructions as per the office  2 Bottle 0     No current facility-administered medications for this visit          ALLERGIES:     Allergies   Allergen Reactions    Wellbutrin Sr  [Bupropion]        SOCIAL HISTORY:     Social History     Social History    Marital status:      Spouse name: N/A    Number of children: N/A    Years of education: N/A     Social History Main Topics    Smoking status: Former Smoker     Types: Cigarettes    Smokeless tobacco: Never Used    Alcohol use No    Drug use: No    Sexual activity: Not Asked     Other Topics Concern    None     Social History Narrative    Caffeine use    Currently  per [employer]    Denied: seeing a dentist    Uses safety equipment - seatbelts           SOCIAL HISTORY:     Family History   Problem Relation Age of Onset    Angina Mother     Prostate cancer Father     Angina Maternal Grandmother        REVIEW OF SYSTEMS:     Review of Systems   Constitutional: Negative  Respiratory: Negative  Cardiovascular: Negative  Gastrointestinal: Negative  Genitourinary: Positive for dysuria, penile pain and urgency  Negative for hematuria  Musculoskeletal: Negative  Skin: Negative  Neurological: Negative  Psychiatric/Behavioral: Negative  PHYSICAL EXAM:     /80   Ht 5' 9" (1 753 m)   Wt 89 8 kg (198 lb)   BMI 29 24 kg/m²     General:  Healthy appearing male in no acute distress  They have a normal affect  There is not appear to be any gross neurologic defects or abnormalities  HEENT:  Normocephalic, atraumatic  Neck is supple without any palpable lymphadenopathy  Cardiovascular:  Patient has normal palpable distal radial pulses  There is no significant peripheral edema  No JVD is noted  Respiratory:  Patient has unlabored respirations  There is no audible wheeze or rhonchi  Abdomen:  Abdomen is without surgical scars  Abdomen is soft and nontender  There is no tympany  Inguinal and umbilical hernia are not appreciated  Genitourinary: Circumcised phallus, testicles descended and orthotopic without nodules  Digita rectal exam performed and demonstrates a smooth small prostate with no nodules    Musculoskeletal:  Patient does not have significant CVA tenderness in the  flank with palpation or percussion  They full range of motion in all 4 extremities  Strength in all 4 extremities appears congruent  Patient is able to ambulate without assistance or difficulty  Dermatologic:  Patient has no skin abnormalities or rashes        LABS:     CBC:   Lab Results   Component Value Date    WBC 5 29 12/15/2015    HGB 14 0 12/15/2015    HCT 41 5 12/15/2015    MCV 87 12/15/2015     (L) 12/15/2015       BMP:   Lab Results   Component Value Date    GLUCOSE 94 01/11/2017    CALCIUM 9 1 11/06/2017     11/06/2017    K 4 2 11/06/2017    CO2 31 11/06/2017     11/06/2017    BUN 15 11/06/2017    CREATININE 1 03 11/06/2017     Lab Results   Component Value Date    PSA 0 6 11/06/2017    PSA 0 5 01/11/2017    PSA 0 5 08/13/2015 5/17/18 GC/Chlamydia - negative    PROCEDURE:     SEE NOTE    ASSESSMENT:     59 y o  male with dysuria    PLAN:     I discussed with the patient that he has newly diagnosed urethral stricture disease  The etiology of this is not 100% known although there is some suggestion is related to prior infection  Common causes including infection, trauma, iatrogenic causes  Definitive management for urethral stricture disease includes reconstruction via a surgical urethroplasty  This involves excision of the urethral scar and re-anastomosis of healthy urethral tissue  This is a involves surgical procedure  It often requires the expertise of a reconstructive trained urologist      Temporizing management includes cystoscopy and either urethral dilations or internal urethrotomies and incision of the scar  We know that this is an ineffective long-term treatment  Scar tissue reforms in these procedures need to be repeated  The efficacy of these procedures diminishes with each subsequent operation  I have recommended that the patient monitor his urinary symptoms  He should call me if these worsen    Otherwise, I will plan to see him back in 3 months with a uroflow postvoid residual

## 2018-08-16 ENCOUNTER — PROCEDURE VISIT (OUTPATIENT)
Dept: UROLOGY | Facility: CLINIC | Age: 58
End: 2018-08-16
Payer: COMMERCIAL

## 2018-08-16 VITALS
BODY MASS INDEX: 29.33 KG/M2 | WEIGHT: 198 LBS | SYSTOLIC BLOOD PRESSURE: 130 MMHG | DIASTOLIC BLOOD PRESSURE: 80 MMHG | HEIGHT: 69 IN

## 2018-08-16 DIAGNOSIS — N35.112 POSTINFECTIVE STRICTURE OF BULBOUS URETHRA: Primary | ICD-10-CM

## 2018-08-16 PROCEDURE — 52281 CYSTOSCOPY AND TREATMENT: CPT | Performed by: UROLOGY

## 2018-08-16 PROCEDURE — 99213 OFFICE O/P EST LOW 20 MIN: CPT | Performed by: UROLOGY

## 2018-08-16 NOTE — PROGRESS NOTES
Cystoscopy  Date/Time: 8/16/2018 8:20 AM  Performed by: Tu Zeng  Authorized by: Tu Zeng     Procedure details: dilation of urethral stricture    Patient tolerance: Patient tolerated the procedure well with no immediate complications    Additional Procedure Details:   Cystoscopy procedure note:    Risk and benefits of flexible cystoscopy were discussed  Informed consent was obtained  Urine dip was adequate for cystoscopy  The patient was placed in the supine position  His genitalia was prepped with Betadine and draped in a sterile fashion  Viscous 2% lidocaine jelly was instilled into the urethra and allowed dwell time for local anesthesia  Flexible cystoscopy was then performed using a 16F scope  The distal urethra was noted to be normal in course and caliber  Once in the bulbar urethra, a pinpoint urethral stricture was noted  A PingTank solo +wire was introduced through the lumen of the stricture and lumen was dilated up to 16 [place]  Once dilated, we were able to pass the scope through the lumen and into the bladder  Pan cystoscopy was then performed  Once inside the bladder, it was carefully inspected in a 360 degree fashion  There was no evidence of mucosal abnormalities, lesions, diverticula or stones  Both ureteral orifices in their orthotopic location were visualized with clear efflux of urine  Retroflexion for evaluation of the bladder neck was normal      Overall this was a negative cystoscopy with the exception of a bulbar urethral stricture which did require dilation

## 2018-08-26 DIAGNOSIS — I10 HYPERTENSION, UNSPECIFIED TYPE: Primary | ICD-10-CM

## 2018-08-26 RX ORDER — METOPROLOL SUCCINATE 50 MG/1
TABLET, EXTENDED RELEASE ORAL
Qty: 90 TABLET | Refills: 2 | Status: SHIPPED | OUTPATIENT
Start: 2018-08-26 | End: 2019-05-19 | Stop reason: SDUPTHER

## 2018-09-12 ENCOUNTER — OFFICE VISIT (OUTPATIENT)
Dept: GASTROENTEROLOGY | Facility: HOSPITAL | Age: 58
End: 2018-09-12
Payer: COMMERCIAL

## 2018-09-12 VITALS
SYSTOLIC BLOOD PRESSURE: 132 MMHG | TEMPERATURE: 97.2 F | BODY MASS INDEX: 29.65 KG/M2 | WEIGHT: 200.2 LBS | DIASTOLIC BLOOD PRESSURE: 70 MMHG | HEIGHT: 69 IN | HEART RATE: 78 BPM

## 2018-09-12 DIAGNOSIS — K20.90 ESOPHAGITIS: ICD-10-CM

## 2018-09-12 DIAGNOSIS — K63.5 POLYP OF SIGMOID COLON, UNSPECIFIED TYPE: ICD-10-CM

## 2018-09-12 DIAGNOSIS — K22.70 BARRETT'S ESOPHAGUS WITHOUT DYSPLASIA: ICD-10-CM

## 2018-09-12 DIAGNOSIS — K21.9 GERD WITHOUT ESOPHAGITIS: Primary | ICD-10-CM

## 2018-09-12 PROCEDURE — 99214 OFFICE O/P EST MOD 30 MIN: CPT | Performed by: PHYSICIAN ASSISTANT

## 2018-09-12 RX ORDER — OMEPRAZOLE 40 MG/1
40 CAPSULE, DELAYED RELEASE ORAL DAILY
Qty: 90 CAPSULE | Refills: 3 | Status: SHIPPED | OUTPATIENT
Start: 2018-09-12 | End: 2019-07-09 | Stop reason: SDUPTHER

## 2018-09-12 NOTE — PROGRESS NOTES
Assessment/Plan:     Diagnoses and all orders for this visit:    GERD without esophagitis  He has no heartburn symptoms however was found to have esophagitis and gastritis on recent upper endoscopy  Would recommend continuing Prilosec daily for the time being  Galindo's esophagus without dysplasia  He was found to have Galindo's on his recent upper endoscopy, no signs of dysplasia  Would recommend continuing omeprazole  He is going to need a repeat upper endoscopy in 3 years for surveillance  -     omeprazole (PriLOSEC) 40 MG capsule; Take 1 capsule (40 mg total) by mouth daily for 30 days    Polyp of sigmoid colon, unspecified type  He was found to have 1 tubular adenomatous polyp on his recent colonoscopy and therefore was recommended to have this repeated in 5 years  Will see him back in 6 months or sooner if necessary  Subjective:      Patient ID: Beata Ronquillo is a 59 y o  male  HPI     This is a follow-up for GERD and to discuss his recent upper endoscopy and colonoscopy  Patient was previously taking Zantac and was having no breakthrough symptoms  He denies any abdominal pain  He denies any difficulty with his bowels  His upper endoscopy did show esophagitis, gastritis and Galindo's without dysplasia  After his procedure was recommended that he stop Zantac in switch to omeprazole 40 mg daily  Colonoscopy was for screening and showed a polyp in the descending colon as well as diverticulosis  The polyp was tubular adenoma and recommended to be repeated in 5 years time      Patient Active Problem List   Diagnosis    Hypertension    Coronary artery disease    Presence of bare metal stent in right coronary artery    Presence of drug coated stent in left circumflex coronary artery    Dyslipidemia    Dermatitis    Erectile dysfunction of non-organic origin    GERD without esophagitis    Spondylosis of cervical region without myelopathy or radiculopathy    Varicosities of leg    Polyp of sigmoid colon    Polyp of colon    Dysuria    Galindo's esophagus without dysplasia     Allergies   Allergen Reactions    Wellbutrin Sr  [Bupropion]      Current Outpatient Prescriptions on File Prior to Visit   Medication Sig    Ascorbic Acid (VITAMIN C) 100 MG tablet Take by mouth    aspirin 81 MG tablet Take 1 tablet by mouth daily    atorvastatin (LIPITOR) 40 mg tablet Take 1 tablet by mouth daily    Garlic 10 MG CAPS Take by mouth    metoprolol succinate (TOPROL-XL) 50 mg 24 hr tablet TAKE 1 TABLET DAILY    Multiple Vitamin-Folic Acid TABS Take by mouth    niacin 500 mg tablet Take 1 tablet by mouth daily    nitroglycerin (NITROSTAT) 0 4 mg SL tablet Place 1 tablet under the tongue every 5 (five) minutes as needed    nystatin-triamcinolone (MYCOLOG-II) cream Apply topically 2 (two) times a day    ranitidine (ZANTAC) 150 mg tablet TAKE 1 TABLET TWICE A DAY    tadalafil (CIALIS) 20 MG tablet Take 1 tablet by mouth    nitroglycerin (NITROSTAT) 0 4 mg SL tablet Place 1 tablet (0 4 mg total) under the tongue every 5 (five) minutes as needed for chest pain for up to 10 days    [DISCONTINUED] omeprazole (PriLOSEC) 40 MG capsule Take 1 capsule (40 mg total) by mouth daily for 30 days    [DISCONTINUED] SUPREP BOWEL PREP KIT 17 5-3 13-1 6 GM/180ML SOLN Take 1 kit by mouth once for 1 dose Please follow instructions as per the office  No current facility-administered medications on file prior to visit        Family History   Problem Relation Age of Onset    Angina Mother     Prostate cancer Father     Angina Maternal Grandmother      Past Medical History:   Diagnosis Date    [facility]     Dysuria     Polyp of sigmoid colon     Uncomplicated alcohol abuse      Social History     Social History    Marital status:      Spouse name: N/A    Number of children: N/A    Years of education: N/A     Social History Main Topics    Smoking status: Former Smoker     Types: Cigarettes    Smokeless tobacco: Never Used    Alcohol use No    Drug use: No    Sexual activity: Not Asked     Other Topics Concern    None     Social History Narrative    Caffeine use    Currently  per [employer]    Denied: seeing a dentist    Uses safety equipment - seatbelts         Past Surgical History:   Procedure Laterality Date    CORONARY ANGIOPLASTY WITH STENT PLACEMENT      stent to the mid and distal RCA  S/P rick placement to the mid circumflex and proximal second obtuse marginal; Last Assessed: 2/2/2017    EGD AND COLONOSCOPY N/A 7/6/2018    Procedure: EGD AND COLONOSCOPY;  Surgeon: Kati Cohn MD;  Location: [facility];  Service: Gastroenterology    INGUINAL HERNIA REPAIR           Review of Systems   All other systems reviewed and are negative  Objective:      /70 (BP Location: Left arm, Patient Position: Sitting, Cuff Size: Standard)   Pulse 78   Temp (!) 97 2 °F (36 2 °C) (Tympanic)   Ht 5' 9" (1 753 m)   Wt 90 8 kg (200 lb 3 2 oz)   BMI 29 56 kg/m²          Physical Exam   Constitutional: He is oriented to person, place, and time  He appears well-developed and well-nourished  No distress  HENT:   Head: Normocephalic and atraumatic  Eyes: Conjunctivae and EOM are normal    Neck: Normal range of motion  Cardiovascular: Normal rate and regular rhythm  Pulmonary/Chest: Effort normal and breath sounds normal    Abdominal: Soft  Bowel sounds are normal  He exhibits no distension  There is no tenderness  Musculoskeletal: Normal range of motion  Neurological: He is alert and oriented to person, place, and time  Skin: Skin is warm and dry  Psychiatric: He has a normal mood and affect   His behavior is normal

## 2018-09-12 NOTE — LETTER
September 12, 2018     Nathan Clarke DO  [address] [facility]  [address]  [facility] [place] [id number]    Patient: Abad Figueroa   YOB: 1960   Date of Visit: 9/12/2018       Dear Dr Pj Cheung: Thank you for referring Newton Acharya to me for evaluation  Below are my notes for this consultation  If you have questions, please do not hesitate to call me  I look forward to following your patient along with you  Sincerely,        Deisy Acosta PA-C        CC: No Recipients  Yue Arellano  9/12/2018  1:49 PM  Sign at close encounter  Assessment/Plan:     Diagnoses and all orders for this visit:    GERD without esophagitis  He has no heartburn symptoms however was found to have esophagitis and gastritis on recent upper endoscopy  Would recommend continuing Prilosec daily for the time being  Galindo's esophagus without dysplasia  He was found to have Galindo's on his recent upper endoscopy, no signs of dysplasia  Would recommend continuing omeprazole  He is going to need a repeat upper endoscopy in 3 years for surveillance  -     omeprazole (PriLOSEC) 40 MG capsule; Take 1 capsule (40 mg total) by mouth daily for 30 days    Polyp of sigmoid colon, unspecified type  He was found to have 1 tubular adenomatous polyp on his recent colonoscopy and therefore was recommended to have this repeated in 5 years  Will see him back in 6 months or sooner if necessary  Subjective:      Patient ID: Abad Figueroa is a 59 y o  male  HPI     This is a follow-up for GERD and to discuss his recent upper endoscopy and colonoscopy  Patient was previously taking Zantac and was having no breakthrough symptoms  He denies any abdominal pain  He denies any difficulty with his bowels  His upper endoscopy did show esophagitis, gastritis and Galindo's without dysplasia  After his procedure was recommended that he stop Zantac in switch to omeprazole 40 mg daily   Colonoscopy was for screening and showed a polyp in the descending colon as well as diverticulosis  The polyp was tubular adenoma and recommended to be repeated in 5 years time  Patient Active Problem List   Diagnosis    Hypertension    Coronary artery disease    Presence of bare metal stent in right coronary artery    Presence of drug coated stent in left circumflex coronary artery    Dyslipidemia    Dermatitis    Erectile dysfunction of non-organic origin    GERD without esophagitis    Spondylosis of cervical region without myelopathy or radiculopathy    Varicosities of leg    Polyp of sigmoid colon    Polyp of colon    Dysuria    Galindo's esophagus without dysplasia     Allergies   Allergen Reactions    Wellbutrin Sr  [Bupropion]      Current Outpatient Prescriptions on File Prior to Visit   Medication Sig    Ascorbic Acid (VITAMIN C) 100 MG tablet Take by mouth    aspirin 81 MG tablet Take 1 tablet by mouth daily    atorvastatin (LIPITOR) 40 mg tablet Take 1 tablet by mouth daily    Garlic 10 MG CAPS Take by mouth    metoprolol succinate (TOPROL-XL) 50 mg 24 hr tablet TAKE 1 TABLET DAILY    Multiple Vitamin-Folic Acid TABS Take by mouth    niacin 500 mg tablet Take 1 tablet by mouth daily    nitroglycerin (NITROSTAT) 0 4 mg SL tablet Place 1 tablet under the tongue every 5 (five) minutes as needed    nystatin-triamcinolone (MYCOLOG-II) cream Apply topically 2 (two) times a day    ranitidine (ZANTAC) 150 mg tablet TAKE 1 TABLET TWICE A DAY    tadalafil (CIALIS) 20 MG tablet Take 1 tablet by mouth    nitroglycerin (NITROSTAT) 0 4 mg SL tablet Place 1 tablet (0 4 mg total) under the tongue every 5 (five) minutes as needed for chest pain for up to 10 days    [DISCONTINUED] omeprazole (PriLOSEC) 40 MG capsule Take 1 capsule (40 mg total) by mouth daily for 30 days    [DISCONTINUED] SUPREP BOWEL PREP KIT 17 5-3 13-1 6 GM/180ML SOLN Take 1 kit by mouth once for 1 dose Please follow instructions as per the office  No current facility-administered medications on file prior to visit  Family History   Problem Relation Age of Onset    Angina Mother     Prostate cancer Father     Angina Maternal Grandmother      Past Medical History:   Diagnosis Date    Acute myocardial infarction ([facility] [address] )     Dysuria     Polyp of sigmoid colon     Uncomplicated alcohol abuse      Social History     Social History    Marital status:      Spouse name: N/A    Number of children: N/A    Years of education: N/A     Social History Main Topics    Smoking status: Former Smoker     Types: Cigarettes    Smokeless tobacco: Never Used    Alcohol use No    Drug use: No    Sexual activity: Not Asked     Other Topics Concern    None     Social History Narrative    Caffeine use    Currently  per [employer]    Denied: seeing a dentist    Uses safety equipment - seatbelts         Past Surgical History:   Procedure Laterality Date    CORONARY ANGIOPLASTY WITH STENT PLACEMENT      stent to the mid and distal RCA  S/P rick placement to the mid circumflex and proximal second obtuse marginal; Last Assessed: 2/2/2017    EGD AND COLONOSCOPY N/A 7/6/2018    Procedure: EGD AND COLONOSCOPY;  Surgeon: Kati Cohn MD;  Location: [facility];  Service: Gastroenterology    INGUINAL HERNIA REPAIR           Review of Systems   All other systems reviewed and are negative  Objective:      /70 (BP Location: Left arm, Patient Position: Sitting, Cuff Size: Standard)   Pulse 78   Temp (!) 97 2 °F (36 2 °C) (Tympanic)   Ht 5' 9" (1 753 m)   Wt 90 8 kg (200 lb 3 2 oz)   BMI 29 56 kg/m²           Physical Exam   Constitutional: He is oriented to person, place, and time  He appears well-developed and well-nourished  No distress  HENT:   Head: Normocephalic and atraumatic  Eyes: Conjunctivae and EOM are normal    Neck: Normal range of motion  Cardiovascular: Normal rate and regular rhythm      Pulmonary/Chest: Effort normal and breath sounds normal    Abdominal: Soft  Bowel sounds are normal  He exhibits no distension  There is no tenderness  Musculoskeletal: Normal range of motion  Neurological: He is alert and oriented to person, place, and time  Skin: Skin is warm and dry  Psychiatric: He has a normal mood and affect   His behavior is normal

## 2018-09-18 ENCOUNTER — OFFICE VISIT (OUTPATIENT)
Dept: INTERNAL MEDICINE CLINIC | Facility: CLINIC | Age: 58
End: 2018-09-18
Payer: COMMERCIAL

## 2018-09-18 VITALS
HEIGHT: 69 IN | SYSTOLIC BLOOD PRESSURE: 122 MMHG | WEIGHT: 204 LBS | BODY MASS INDEX: 30.21 KG/M2 | OXYGEN SATURATION: 97 % | TEMPERATURE: 96.9 F | DIASTOLIC BLOOD PRESSURE: 70 MMHG | HEART RATE: 72 BPM

## 2018-09-18 DIAGNOSIS — L30.9 DERMATITIS: ICD-10-CM

## 2018-09-18 DIAGNOSIS — I10 ESSENTIAL HYPERTENSION: ICD-10-CM

## 2018-09-18 DIAGNOSIS — Z23 NEEDS FLU SHOT: ICD-10-CM

## 2018-09-18 DIAGNOSIS — I25.10 CORONARY ARTERY DISEASE INVOLVING NATIVE CORONARY ARTERY OF NATIVE HEART WITHOUT ANGINA PECTORIS: Primary | ICD-10-CM

## 2018-09-18 DIAGNOSIS — K22.70 BARRETT'S ESOPHAGUS DETERMINED BY BIOPSY: ICD-10-CM

## 2018-09-18 PROCEDURE — 1036F TOBACCO NON-USER: CPT | Performed by: INTERNAL MEDICINE

## 2018-09-18 PROCEDURE — 3074F SYST BP LT 130 MM HG: CPT | Performed by: INTERNAL MEDICINE

## 2018-09-18 PROCEDURE — 99214 OFFICE O/P EST MOD 30 MIN: CPT | Performed by: INTERNAL MEDICINE

## 2018-09-18 PROCEDURE — 3078F DIAST BP <80 MM HG: CPT | Performed by: INTERNAL MEDICINE

## 2018-09-18 NOTE — PROGRESS NOTES
Assessment/Plan:         Diagnoses and all orders for this visit:    Coronary artery disease involving native coronary artery of native heart without angina pectoris  Cardio followup in Feb  Rx for fbw      Dermatitis  -     nystatin-triamcinolone (MYCOLOG-II) cream; Apply topically 2 (two) times a day    Essential hypertension  No added salt diet  Exercise regularly    Galindo's esophagus determined by biopsy  Continue Omeprazole daily and knows needs egd in 3 years    Flu shot today  Discussed shingrix  Rx for fbw  Rto 6 months     Patient ID: Alvaro So is a 59 y o  male  HPI   Pt doing well overall  No chest pain or sob  Had egd and dx with Barretts Esophagus so three year follow up for egd and on omeprazole daily  No abdominal pain  No abdominal pain  NO sob  Had one episode of heat issues during the summer at work -sxs resolved with rest and cooling        Review of Systems   Constitutional: Negative  HENT: Negative  Eyes: Negative  Respiratory: Negative  Cardiovascular: Negative  Gastrointestinal: Negative  Endocrine: Negative  Genitourinary: Negative  Musculoskeletal: Negative  Skin: Negative  Allergic/Immunologic: Negative  Neurological: Negative  Hematological: Negative  Psychiatric/Behavioral: Negative  Past Medical History:   Diagnosis Date    Acute myocardial infarction ([address] [address] )     Dysuria     Polyp of sigmoid colon     Uncomplicated alcohol abuse      Past Surgical History:   Procedure Laterality Date    CORONARY ANGIOPLASTY WITH STENT PLACEMENT      stent to the mid and distal RCA   S/P rick placement to the mid circumflex and proximal second obtuse marginal; Last Assessed: 2/2/2017    EGD AND COLONOSCOPY N/A 7/6/2018    Procedure: EGD AND COLONOSCOPY;  Surgeon: Hailey Amato MD;  Location: [facility];  Service: Gastroenterology    INGUINAL HERNIA REPAIR       Social History     Social History    Marital status:      Spouse name: N/A   Grace Bender Number of children: N/A    Years of education: N/A     Occupational History    Not on file  Social History Main Topics    Smoking status: Former Smoker     Types: Cigarettes    Smokeless tobacco: Never Used    Alcohol use No    Drug use: No    Sexual activity: Not on file     Other Topics Concern    Not on file     Social History Narrative    Caffeine use    Currently  per [employer]    Denied: seeing a dentist    Uses safety equipment - seatbelts         Allergies   Allergen Reactions    Wellbutrin Sr  [Bupropion]            /70   Pulse 72   Temp (!) 96 9 °F (36 1 °C) (Tympanic)   Ht 5' 9" (1 753 m)   Wt 92 5 kg (204 lb)   SpO2 97%   BMI 30 13 kg/m²          Physical Exam   Constitutional: He is oriented to person, place, and time  He appears well-developed and well-nourished  No distress  HENT:   Head: Normocephalic and atraumatic  Right Ear: External ear normal    Left Ear: External ear normal    Nose: Nose normal    Mouth/Throat: Oropharynx is clear and moist  No oropharyngeal exudate  Eyes: Conjunctivae and EOM are normal  Pupils are equal, round, and reactive to light  No scleral icterus  Neck: Normal range of motion  Neck supple  No JVD present  Cardiovascular: Normal rate, regular rhythm, normal heart sounds and intact distal pulses  Pulmonary/Chest: Effort normal and breath sounds normal    Abdominal: Soft  Bowel sounds are normal    Musculoskeletal: Normal range of motion  He exhibits no edema, tenderness or deformity  Lymphadenopathy:     He has no cervical adenopathy  Neurological: He is alert and oriented to person, place, and time  He has normal reflexes  He displays normal reflexes  No cranial nerve deficit  He exhibits normal muscle tone  Coordination normal    Skin: Skin is warm and dry  He is not diaphoretic  Psychiatric: He has a normal mood and affect   His behavior is normal  Judgment and thought content normal    Nursing note and vitals reviewed

## 2018-09-19 PROCEDURE — 90682 RIV4 VACC RECOMBINANT DNA IM: CPT

## 2018-09-19 PROCEDURE — 90471 IMMUNIZATION ADMIN: CPT

## 2018-10-25 DIAGNOSIS — E78.2 MIXED HYPERLIPIDEMIA: Primary | ICD-10-CM

## 2018-10-25 RX ORDER — ATORVASTATIN CALCIUM 40 MG/1
TABLET, FILM COATED ORAL
Qty: 90 TABLET | Refills: 2 | Status: SHIPPED | OUTPATIENT
Start: 2018-10-25 | End: 2019-05-01 | Stop reason: ALTCHOICE

## 2018-12-12 ENCOUNTER — LAB (OUTPATIENT)
Dept: LAB | Facility: HOSPITAL | Age: 58
End: 2018-12-12
Attending: INTERNAL MEDICINE
Payer: COMMERCIAL

## 2018-12-12 DIAGNOSIS — I10 ESSENTIAL HYPERTENSION: ICD-10-CM

## 2018-12-12 DIAGNOSIS — I25.10 CORONARY ARTERY DISEASE INVOLVING NATIVE CORONARY ARTERY OF NATIVE HEART WITHOUT ANGINA PECTORIS: ICD-10-CM

## 2018-12-12 LAB
ALBUMIN SERPL BCP-MCNC: 3.8 G/DL (ref 3.5–5)
ALP SERPL-CCNC: 70 U/L (ref 46–116)
ALT SERPL W P-5'-P-CCNC: 57 U/L (ref 12–78)
ANION GAP SERPL CALCULATED.3IONS-SCNC: 7 MMOL/L (ref 4–13)
AST SERPL W P-5'-P-CCNC: 38 U/L (ref 5–45)
BILIRUB SERPL-MCNC: 0.7 MG/DL (ref 0.2–1)
BUN SERPL-MCNC: 15 MG/DL (ref 5–25)
CALCIUM SERPL-MCNC: 8.9 MG/DL (ref 8.3–10.1)
CHLORIDE SERPL-SCNC: 104 MMOL/L (ref 100–108)
CHOLEST SERPL-MCNC: 158 MG/DL (ref 50–200)
CO2 SERPL-SCNC: 31 MMOL/L (ref 21–32)
CREAT SERPL-MCNC: 1.03 MG/DL (ref 0.6–1.3)
GFR SERPL CREATININE-BSD FRML MDRD: 79 ML/MIN/1.73SQ M
GLUCOSE P FAST SERPL-MCNC: 98 MG/DL (ref 65–99)
HDLC SERPL-MCNC: 44 MG/DL (ref 40–60)
LDLC SERPL CALC-MCNC: 96 MG/DL (ref 0–100)
NONHDLC SERPL-MCNC: 114 MG/DL
POTASSIUM SERPL-SCNC: 3.9 MMOL/L (ref 3.5–5.3)
PROT SERPL-MCNC: 6.6 G/DL (ref 6.4–8.2)
SODIUM SERPL-SCNC: 142 MMOL/L (ref 136–145)
TRIGL SERPL-MCNC: 88 MG/DL

## 2018-12-12 PROCEDURE — 80053 COMPREHEN METABOLIC PANEL: CPT

## 2018-12-12 PROCEDURE — 80061 LIPID PANEL: CPT

## 2018-12-12 PROCEDURE — 36415 COLL VENOUS BLD VENIPUNCTURE: CPT

## 2019-02-21 ENCOUNTER — OFFICE VISIT (OUTPATIENT)
Dept: CARDIOLOGY CLINIC | Facility: HOSPITAL | Age: 59
End: 2019-02-21
Payer: COMMERCIAL

## 2019-02-21 VITALS
WEIGHT: 208.8 LBS | HEART RATE: 76 BPM | DIASTOLIC BLOOD PRESSURE: 64 MMHG | HEIGHT: 69 IN | SYSTOLIC BLOOD PRESSURE: 118 MMHG | BODY MASS INDEX: 30.93 KG/M2

## 2019-02-21 DIAGNOSIS — E78.5 DYSLIPIDEMIA: ICD-10-CM

## 2019-02-21 DIAGNOSIS — Z95.5 PRESENCE OF DRUG COATED STENT IN LEFT CIRCUMFLEX CORONARY ARTERY: ICD-10-CM

## 2019-02-21 DIAGNOSIS — I25.10 CORONARY ARTERY DISEASE INVOLVING NATIVE CORONARY ARTERY OF NATIVE HEART WITHOUT ANGINA PECTORIS: Primary | ICD-10-CM

## 2019-02-21 DIAGNOSIS — I10 BENIGN ESSENTIAL HYPERTENSION: ICD-10-CM

## 2019-02-21 DIAGNOSIS — Z95.5 PRESENCE OF BARE METAL STENT IN RIGHT CORONARY ARTERY: ICD-10-CM

## 2019-02-21 PROCEDURE — 99214 OFFICE O/P EST MOD 30 MIN: CPT | Performed by: INTERNAL MEDICINE

## 2019-02-21 PROCEDURE — 93000 ELECTROCARDIOGRAM COMPLETE: CPT | Performed by: INTERNAL MEDICINE

## 2019-02-21 NOTE — PROGRESS NOTES
Cardiology Follow Up    Fabrizio Mary  1960  [id number]  [address] [place] [address]    1  Coronary artery disease involving native coronary artery of native heart without angina pectoris     2  Presence of drug coated stent in left circumflex coronary artery     3  Presence of bare metal stent in right coronary artery     4  Benign essential hypertension  POCT ECG   5  Dyslipidemia  Lipid Panel with Direct LDL reflex       Discussion/Summary:  Mr DEMAR CHRISTIANSEN is a pleasant 42-year-old male who presents to the office for routine follow-up  From a cardiac perspective he has been feeling well  He offers no cardiopulmonary complaints  His blood pressure is well controlled in the office today  He will remain on his current regimen as prescribed  His most recent lipids were reviewed  His LDL is suboptimal   I did recommend changing from atorvastatin to rosuvastatin  He admits to dietary indiscretion and has made some dietary changes  He wishes to have these reassessed  If his LDL remains suboptimal I would advise the change as noted above  Otherwise he is active and asymptomatic  No cardiac testing is indicated  I will see him back in the office in a year or sooner if deemed necessary  Interval History:  Mr DEMAR CHRISTIANSEN is a pleasant 42-year-old male who presents to the office today for routine follow-up  He continues to dance regularly performing various styles of dance  He does so one to two times per week  He does so without chest pain or shortness of breath  He denies symptoms of heart failure including increasing lower extremity edema, paroxysmal nocturnal dyspnea, orthopnea, acute weight gain or increasing abdominal girth  He denies lightheadedness, syncope or presyncope  He denies palpitations or symptoms of claudication      Problem List     Hypertension    Coronary artery disease    Presence of bare metal stent in right coronary artery    Presence of drug coated stent in left circumflex coronary artery    Dyslipidemia        Past Medical History:   Diagnosis Date    [facility] [address]     Dysuria     Polyp of sigmoid colon     Uncomplicated alcohol abuse      Social History     Socioeconomic History    Marital status:      Spouse name: Not on file    Number of children: Not on file    Years of education: Not on file    Highest education level: Not on file   Occupational History    Not on file   Social Needs    Financial resource strain: Not on file    Food insecurity:     Worry: Not on file     Inability: Not on file    Transportation needs:     Medical: Not on file     Non-medical: Not on file   Tobacco Use    Smoking status: Former Smoker     Types: Cigarettes    Smokeless tobacco: Never Used   Substance and Sexual Activity    Alcohol use: No    Drug use: No    Sexual activity: Not on file   Lifestyle    Physical activity:     Days per week: Not on file     Minutes per session: Not on file    Stress: Not on file   Relationships    Social connections:     Talks on phone: Not on file     Gets together: Not on file     Attends Muslim service: Not on file     Active member of club or organization: Not on file     Attends meetings of clubs or organizations: Not on file     Relationship status: Not on file    Intimate partner violence:     Fear of current or ex partner: Not on file     Emotionally abused: Not on file     Physically abused: Not on file     Forced sexual activity: Not on file   Other Topics Concern    Not on file   Social History Narrative    Caffeine use    Currently  per [employer]    Denied: seeing a dentist    Uses safety equipment - seatbelts      Family History   Problem Relation Age of Onset    Angina Mother     Prostate cancer Father     Angina Maternal Grandmother      Past Surgical History:   Procedure Laterality Date    CORONARY ANGIOPLASTY WITH STENT PLACEMENT      stent to the mid and distal RCA   S/P rick placement to the mid circumflex and proximal second obtuse marginal; Last Assessed: 2/2/2017    EGD AND COLONOSCOPY N/A 7/6/2018    Procedure: EGD AND COLONOSCOPY;  Surgeon: Roma Dumont MD;  Location: [facility];  Service: Gastroenterology    INGUINAL HERNIA REPAIR         Current Outpatient Medications:     Ascorbic Acid (VITAMIN C) 100 MG tablet, Take by mouth, Disp: , Rfl:     aspirin 81 MG tablet, Take 1 tablet by mouth daily, Disp: , Rfl:     atorvastatin (LIPITOR) 40 mg tablet, TAKE 1 TABLET DAILY, Disp: 90 tablet, Rfl: 2    Garlic 10 MG CAPS, Take by mouth, Disp: , Rfl:     metoprolol succinate (TOPROL-XL) 50 mg 24 hr tablet, TAKE 1 TABLET DAILY, Disp: 90 tablet, Rfl: 2    Multiple Vitamin-Folic Acid TABS, Take by mouth, Disp: , Rfl:     niacin 500 mg tablet, Take 1 tablet by mouth daily, Disp: , Rfl:     nitroglycerin (NITROSTAT) 0 4 mg SL tablet, Place 1 tablet under the tongue every 5 (five) minutes as needed, Disp: , Rfl:     nystatin-triamcinolone (MYCOLOG-II) cream, Apply topically 2 (two) times a day, Disp: 30 g, Rfl: 3    omeprazole (PriLOSEC) 40 MG capsule, Take 1 capsule (40 mg total) by mouth daily for 30 days, Disp: 90 capsule, Rfl: 3    tadalafil (CIALIS) 20 MG tablet, Take 1 tablet by mouth, Disp: , Rfl:   Allergies   Allergen Reactions    Wellbutrin Sr  [Bupropion]        Labs:     Chemistry        Component Value Date/Time     12/14/2015 1035    K 3 9 12/12/2018 0625    K 3 7 12/14/2015 1035     12/12/2018 0625     12/14/2015 1035    CO2 31 12/12/2018 0625    CO2 29 3 12/14/2015 1035    BUN 15 12/12/2018 0625    BUN 17 12/14/2015 1035    CREATININE 1 03 12/12/2018 0625    CREATININE 1 13 12/14/2015 1035        Component Value Date/Time    CALCIUM 8 9 12/12/2018 0625    CALCIUM 8 5 12/14/2015 1035    ALKPHOS 70 12/12/2018 0625    ALKPHOS 67 12/14/2015 1035    AST 38 12/12/2018 0625    AST 31 12/14/2015 1035    ALT 57 12/12/2018 0625    ALT 43 12/14/2015 1035    BILITOT 0 54 12/14/2015 1035            Lab Results   Component Value Date    CHOL 138 12/15/2015    CHOL 129 08/13/2015     Lab Results   Component Value Date    HDL 44 12/12/2018    HDL 50 11/06/2017    HDL 45 01/11/2017     Lab Results   Component Value Date    LDLCALC 96 12/12/2018    LDLCALC 65 11/06/2017    LDLCALC 73 01/11/2017     Lab Results   Component Value Date    TRIG 88 12/12/2018    TRIG 118 11/06/2017    TRIG 127 01/11/2017     No results found for: CHOLHDL    Imaging: No results found  ECG:  Normal sinus rhythm, normal ECG      Review of Systems   Cardiovascular: Negative for chest pain, claudication and dyspnea on exertion  All other systems reviewed and are negative  Vitals:    02/21/19 1525   BP: 118/64   Pulse: 76     Vitals:    02/21/19 1525   Weight: 94 7 kg (208 lb 12 8 oz)     Height: 5' 9" (175 3 cm)   Body mass index is 30 83 kg/m²      Physical Exam:  General:  Alert and cooperative, appears stated age  HEENT:  PERRLA, EOMI, no scleral icterus, no conjunctival pallor  Neck:  No lymphadenopathy, no thyromegaly, no carotid bruits, no elevated JVP  Heart[de-identified]  Regular rate and rhythm, normal S1/S2, no S3/S4, no murmur  Lungs:  Clear to auscultation bilaterally   Abdomen:  Soft, non-tender, positive bowel sounds, no rebound or guarding,   no organomegaly   Extremities:  No clubbing, cyanosis or edema   Vascular:  2+ pedal pulses  Skin:  No rashes or lesions on exposed skin  Neurologic:  Cranial nerves II-XII grossly intact without focal deficits

## 2019-04-10 ENCOUNTER — APPOINTMENT (OUTPATIENT)
Dept: LAB | Facility: HOSPITAL | Age: 59
End: 2019-04-10
Attending: INTERNAL MEDICINE
Payer: COMMERCIAL

## 2019-04-10 DIAGNOSIS — E78.5 DYSLIPIDEMIA: ICD-10-CM

## 2019-04-10 LAB
CHOLEST SERPL-MCNC: 165 MG/DL (ref 50–200)
HDLC SERPL-MCNC: 44 MG/DL (ref 40–60)
LDLC SERPL CALC-MCNC: 96 MG/DL (ref 0–100)
TRIGL SERPL-MCNC: 126 MG/DL

## 2019-04-10 PROCEDURE — 80061 LIPID PANEL: CPT

## 2019-04-10 PROCEDURE — 36415 COLL VENOUS BLD VENIPUNCTURE: CPT

## 2019-04-17 ENCOUNTER — TELEPHONE (OUTPATIENT)
Dept: CARDIOLOGY CLINIC | Facility: HOSPITAL | Age: 59
End: 2019-04-17

## 2019-04-17 DIAGNOSIS — E78.5 DYSLIPIDEMIA: Primary | ICD-10-CM

## 2019-04-17 RX ORDER — ROSUVASTATIN CALCIUM 40 MG/1
40 TABLET, COATED ORAL
Qty: 90 TABLET | Refills: 3 | Status: SHIPPED | OUTPATIENT
Start: 2019-04-17 | End: 2019-06-20 | Stop reason: SDUPTHER

## 2019-05-01 ENCOUNTER — OFFICE VISIT (OUTPATIENT)
Dept: INTERNAL MEDICINE CLINIC | Facility: CLINIC | Age: 59
End: 2019-05-01
Payer: COMMERCIAL

## 2019-05-01 VITALS
HEART RATE: 77 BPM | WEIGHT: 200.38 LBS | DIASTOLIC BLOOD PRESSURE: 70 MMHG | BODY MASS INDEX: 29.68 KG/M2 | TEMPERATURE: 97.4 F | OXYGEN SATURATION: 97 % | SYSTOLIC BLOOD PRESSURE: 122 MMHG | HEIGHT: 69 IN

## 2019-05-01 DIAGNOSIS — R11.0 NAUSEA: ICD-10-CM

## 2019-05-01 DIAGNOSIS — J06.9 UPPER RESPIRATORY TRACT INFECTION, UNSPECIFIED TYPE: Primary | ICD-10-CM

## 2019-05-01 PROCEDURE — 99213 OFFICE O/P EST LOW 20 MIN: CPT | Performed by: INTERNAL MEDICINE

## 2019-05-01 PROCEDURE — 3008F BODY MASS INDEX DOCD: CPT | Performed by: INTERNAL MEDICINE

## 2019-05-01 PROCEDURE — 1036F TOBACCO NON-USER: CPT | Performed by: INTERNAL MEDICINE

## 2019-05-01 RX ORDER — AMOXICILLIN 500 MG/1
500 CAPSULE ORAL EVERY 8 HOURS SCHEDULED
Qty: 21 CAPSULE | Refills: 0 | Status: SHIPPED | OUTPATIENT
Start: 2019-05-01 | End: 2019-05-08

## 2019-05-03 ENCOUNTER — TELEPHONE (OUTPATIENT)
Dept: INTERNAL MEDICINE CLINIC | Facility: CLINIC | Age: 59
End: 2019-05-03

## 2019-05-07 ENCOUNTER — TELEPHONE (OUTPATIENT)
Dept: CARDIOLOGY CLINIC | Facility: CLINIC | Age: 59
End: 2019-05-07

## 2019-05-07 DIAGNOSIS — E78.5 DYSLIPIDEMIA: Primary | ICD-10-CM

## 2019-05-07 RX ORDER — EZETIMIBE 10 MG/1
10 TABLET ORAL DAILY
Qty: 30 TABLET | Refills: 11 | Status: SHIPPED | OUTPATIENT
Start: 2019-05-07 | End: 2019-06-20 | Stop reason: SDUPTHER

## 2019-05-19 DIAGNOSIS — I10 HYPERTENSION, UNSPECIFIED TYPE: ICD-10-CM

## 2019-05-20 RX ORDER — METOPROLOL SUCCINATE 50 MG/1
TABLET, EXTENDED RELEASE ORAL
Qty: 90 TABLET | Refills: 2 | Status: SHIPPED | OUTPATIENT
Start: 2019-05-20 | End: 2020-03-14

## 2019-06-18 ENCOUNTER — TELEPHONE (OUTPATIENT)
Dept: INTERNAL MEDICINE CLINIC | Facility: CLINIC | Age: 59
End: 2019-06-18

## 2019-06-20 DIAGNOSIS — E78.5 DYSLIPIDEMIA: ICD-10-CM

## 2019-06-20 RX ORDER — ROSUVASTATIN CALCIUM 40 MG/1
40 TABLET, COATED ORAL
Qty: 90 TABLET | Refills: 2 | Status: SHIPPED | OUTPATIENT
Start: 2019-06-20 | End: 2019-06-21

## 2019-06-20 RX ORDER — EZETIMIBE 10 MG/1
10 TABLET ORAL DAILY
Qty: 90 TABLET | Refills: 2 | Status: SHIPPED | OUTPATIENT
Start: 2019-06-20 | End: 2020-03-13 | Stop reason: SDUPTHER

## 2019-06-21 DIAGNOSIS — E78.5 HYPERLIPIDEMIA, UNSPECIFIED HYPERLIPIDEMIA TYPE: Primary | ICD-10-CM

## 2019-06-21 RX ORDER — ATORVASTATIN CALCIUM 40 MG/1
40 TABLET, FILM COATED ORAL DAILY
Qty: 90 TABLET | Refills: 3 | OUTPATIENT
Start: 2019-06-21 | End: 2020-02-03 | Stop reason: SDUPTHER

## 2019-07-09 DIAGNOSIS — K20.90 ESOPHAGITIS: ICD-10-CM

## 2019-07-09 RX ORDER — OMEPRAZOLE 40 MG/1
40 CAPSULE, DELAYED RELEASE ORAL DAILY
Qty: 90 CAPSULE | Refills: 3 | Status: SHIPPED | OUTPATIENT
Start: 2019-07-09 | End: 2021-07-23 | Stop reason: ALTCHOICE

## 2019-09-09 ENCOUNTER — TELEPHONE (OUTPATIENT)
Dept: INTERNAL MEDICINE CLINIC | Facility: CLINIC | Age: 59
End: 2019-09-09

## 2019-09-17 ENCOUNTER — OFFICE VISIT (OUTPATIENT)
Dept: INTERNAL MEDICINE CLINIC | Facility: CLINIC | Age: 59
End: 2019-09-17
Payer: COMMERCIAL

## 2019-09-17 VITALS
WEIGHT: 199.6 LBS | HEART RATE: 82 BPM | DIASTOLIC BLOOD PRESSURE: 78 MMHG | HEIGHT: 69 IN | OXYGEN SATURATION: 98 % | TEMPERATURE: 97.8 F | SYSTOLIC BLOOD PRESSURE: 124 MMHG | BODY MASS INDEX: 29.56 KG/M2

## 2019-09-17 DIAGNOSIS — Z12.5 PROSTATE CANCER SCREENING: ICD-10-CM

## 2019-09-17 DIAGNOSIS — E78.5 DYSLIPIDEMIA: ICD-10-CM

## 2019-09-17 DIAGNOSIS — I10 BENIGN ESSENTIAL HYPERTENSION: ICD-10-CM

## 2019-09-17 DIAGNOSIS — Z95.5 PRESENCE OF BARE METAL STENT IN RIGHT CORONARY ARTERY: ICD-10-CM

## 2019-09-17 DIAGNOSIS — Z11.59 ENCOUNTER FOR HEPATITIS C SCREENING TEST FOR LOW RISK PATIENT: Primary | ICD-10-CM

## 2019-09-17 PROBLEM — R11.0 NAUSEA: Status: RESOLVED | Noted: 2019-05-01 | Resolved: 2019-09-17

## 2019-09-17 PROBLEM — R30.0 DYSURIA: Status: RESOLVED | Noted: 2018-05-17 | Resolved: 2019-09-17

## 2019-09-17 PROBLEM — J06.9 UPPER RESPIRATORY TRACT INFECTION: Status: RESOLVED | Noted: 2019-05-01 | Resolved: 2019-09-17

## 2019-09-17 PROBLEM — K63.5 POLYP OF COLON: Status: RESOLVED | Noted: 2018-05-16 | Resolved: 2019-09-17

## 2019-09-17 PROCEDURE — 3078F DIAST BP <80 MM HG: CPT | Performed by: INTERNAL MEDICINE

## 2019-09-17 PROCEDURE — 3008F BODY MASS INDEX DOCD: CPT | Performed by: INTERNAL MEDICINE

## 2019-09-17 PROCEDURE — 3074F SYST BP LT 130 MM HG: CPT | Performed by: INTERNAL MEDICINE

## 2019-09-17 PROCEDURE — 99214 OFFICE O/P EST MOD 30 MIN: CPT | Performed by: INTERNAL MEDICINE

## 2019-09-17 NOTE — PROGRESS NOTES
Assessment/Plan:         Diagnoses and all orders for this visit:    Encounter for hepatitis C screening test for low risk patient  -     Hepatitis C antibody; Future    Presence of bare metal stent in right coronary artery  Pt doing well He is active without sxs Has cardio followup in Feb  Flu shot at work upcoming    Benign essential hypertension  No added salt diet Exercise regularly  Continue present rx     Dyslipidemia  Recent lipid panel wnl    Rto 6 months         Patient ID: Cynthia De Jesus is a 62 y o  male  HPI   Pt generally well No chest pain or sob No dizziness or uri sxs still working fulltime No bowel or bladder changes   Eye exam utd He still dances regularly No falls No limiting joint pain         Review of Systems   Constitutional: Negative  HENT: Negative  Respiratory: Negative  Cardiovascular: Negative  Gastrointestinal: Negative  Genitourinary: Negative  Musculoskeletal: Negative  Skin: Negative  Neurological: Negative  Hematological: Negative  Psychiatric/Behavioral: Negative  Past Medical History:   Diagnosis Date    Acute [facility] ([facility] [address]     Dysuria     Polyp of sigmoid colon     Uncomplicated alcohol abuse      Past Surgical History:   Procedure Laterality Date    CORONARY ANGIOPLASTY WITH STENT PLACEMENT      stent to the mid and distal RCA   S/P rick placement to the mid circumflex and proximal second obtuse marginal; Last Assessed: 2/2/2017    EGD AND COLONOSCOPY N/A 7/6/2018    Procedure: EGD AND COLONOSCOPY;  Surgeon: Sultana Batres MD;  Location: [facility];  Service: Gastroenterology    INGUINAL HERNIA REPAIR       Social History     Socioeconomic History    Marital status:      Spouse name: Not on file    Number of children: Not on file    Years of education: Not on file    Highest education level: Not on file   Occupational History    Not on file   Social Needs    Financial resource strain: Not on file   Shahram-Ramandeep insecurity:     Worry: Not on file     Inability: Not on file    Transportation needs:     Medical: Not on file     Non-medical: Not on file   Tobacco Use    Smoking status: Former Smoker     Types: Cigarettes    Smokeless tobacco: Never Used   Substance and Sexual Activity    Alcohol use: No    Drug use: No    Sexual activity: Not on file   Lifestyle    Physical activity:     Days per week: Not on file     Minutes per session: Not on file    Stress: Not on file   Relationships    Social connections:     Talks on phone: Not on file     Gets together: Not on file     Attends Confucianism service: Not on file     Active member of club or organization: Not on file     Attends meetings of clubs or organizations: Not on file     Relationship status: Not on file    Intimate partner violence:     Fear of current or ex partner: Not on file     Emotionally abused: Not on file     Physically abused: Not on file     Forced sexual activity: Not on file   Other Topics Concern    Not on file   Social History Narrative    Caffeine use    Currently  per [employer]    Denied: seeing a dentist    Uses safety equipment - seatbelts     Allergies   Allergen Reactions    Wellbutrin Sr  [Bupropion]          /78   Pulse 82   Temp 97 8 °F (36 6 °C) (Tympanic)   Ht 5' 9" (1 753 m)   Wt 90 5 kg (199 lb 9 6 oz)   SpO2 98%   BMI 29 48 kg/m²          Physical Exam   Constitutional: He is oriented to person, place, and time  He appears well-developed and well-nourished  No distress  HENT:   Head: Normocephalic and atraumatic  Right Ear: External ear normal    Left Ear: External ear normal    Nose: Nose normal    Mouth/Throat: Oropharynx is clear and moist  No oropharyngeal exudate  Eyes: Pupils are equal, round, and reactive to light  Conjunctivae and EOM are normal  No scleral icterus  Neck: Normal range of motion  Neck supple  No JVD present  Cardiovascular: Normal rate and regular rhythm     No murmur heard   Pulmonary/Chest: Effort normal and breath sounds normal  No stridor  No respiratory distress  He has no wheezes  Abdominal: Soft  Bowel sounds are normal  He exhibits no distension  There is no tenderness  Musculoskeletal: Normal range of motion  Lymphadenopathy:     He has no cervical adenopathy  Neurological: He is alert and oriented to person, place, and time  He displays normal reflexes  No cranial nerve deficit  He exhibits normal muscle tone  Coordination normal    Skin: Skin is warm and dry  Capillary refill takes less than 2 seconds  He is not diaphoretic  Psychiatric: He has a normal mood and affect  His behavior is normal  Judgment and thought content normal    Nursing note and vitals reviewed  BMI Counseling: Body mass index is 29 48 kg/m²  The BMI is above normal  Nutrition recommendations include decreasing overall calorie intake

## 2019-09-17 NOTE — PATIENT INSTRUCTIONS

## 2019-10-01 ENCOUNTER — APPOINTMENT (OUTPATIENT)
Dept: LAB | Facility: HOSPITAL | Age: 59
End: 2019-10-01
Attending: INTERNAL MEDICINE
Payer: COMMERCIAL

## 2019-10-01 DIAGNOSIS — Z11.59 ENCOUNTER FOR HEPATITIS C SCREENING TEST FOR LOW RISK PATIENT: ICD-10-CM

## 2019-10-01 DIAGNOSIS — E78.5 DYSLIPIDEMIA: ICD-10-CM

## 2019-10-01 DIAGNOSIS — I10 BENIGN ESSENTIAL HYPERTENSION: ICD-10-CM

## 2019-10-01 DIAGNOSIS — Z12.5 PROSTATE CANCER SCREENING: ICD-10-CM

## 2019-10-01 LAB
ALBUMIN SERPL BCP-MCNC: 3.9 G/DL (ref 3.5–5)
ALP SERPL-CCNC: 68 U/L (ref 46–116)
ALT SERPL W P-5'-P-CCNC: 34 U/L (ref 12–78)
ANION GAP SERPL CALCULATED.3IONS-SCNC: 6 MMOL/L (ref 4–13)
AST SERPL W P-5'-P-CCNC: 29 U/L (ref 5–45)
BILIRUB SERPL-MCNC: 0.6 MG/DL (ref 0.2–1)
BUN SERPL-MCNC: 9 MG/DL (ref 5–25)
CALCIUM SERPL-MCNC: 8.7 MG/DL (ref 8.3–10.1)
CHLORIDE SERPL-SCNC: 105 MMOL/L (ref 100–108)
CO2 SERPL-SCNC: 32 MMOL/L (ref 21–32)
CREAT SERPL-MCNC: 1.12 MG/DL (ref 0.6–1.3)
GFR SERPL CREATININE-BSD FRML MDRD: 71 ML/MIN/1.73SQ M
GLUCOSE P FAST SERPL-MCNC: 104 MG/DL (ref 65–99)
HCV AB SER QL: NORMAL
LDLC SERPL DIRECT ASSAY-MCNC: 75 MG/DL (ref 0–100)
POTASSIUM SERPL-SCNC: 4.2 MMOL/L (ref 3.5–5.3)
PROT SERPL-MCNC: 7.1 G/DL (ref 6.4–8.2)
PSA SERPL-MCNC: 0.5 NG/ML (ref 0–4)
SODIUM SERPL-SCNC: 143 MMOL/L (ref 136–145)

## 2019-10-01 PROCEDURE — 86803 HEPATITIS C AB TEST: CPT

## 2019-10-01 PROCEDURE — 84153 ASSAY OF PSA TOTAL: CPT

## 2019-10-01 PROCEDURE — 36415 COLL VENOUS BLD VENIPUNCTURE: CPT

## 2019-10-01 PROCEDURE — 83721 ASSAY OF BLOOD LIPOPROTEIN: CPT

## 2019-10-01 PROCEDURE — 80053 COMPREHEN METABOLIC PANEL: CPT

## 2019-11-27 ENCOUNTER — APPOINTMENT (OUTPATIENT)
Dept: LAB | Facility: HOSPITAL | Age: 59
End: 2019-11-27
Payer: COMMERCIAL

## 2019-11-27 ENCOUNTER — TRANSCRIBE ORDERS (OUTPATIENT)
Dept: ADMINISTRATIVE | Facility: HOSPITAL | Age: 59
End: 2019-11-27

## 2019-11-27 DIAGNOSIS — E55.9 VITAMIN D DEFICIENCY DISEASE: ICD-10-CM

## 2019-11-27 DIAGNOSIS — E66.8 OBESITY OF ENDOCRINE ORIGIN: ICD-10-CM

## 2019-11-27 DIAGNOSIS — R53.83 FATIGUE, UNSPECIFIED TYPE: ICD-10-CM

## 2019-11-27 DIAGNOSIS — R39.81 FUNCTIONAL URINARY INCONTINENCE: ICD-10-CM

## 2019-11-27 DIAGNOSIS — R53.83 FATIGUE, UNSPECIFIED TYPE: Primary | ICD-10-CM

## 2019-11-27 LAB
25(OH)D3 SERPL-MCNC: 25.8 NG/ML (ref 30–100)
ALBUMIN SERPL BCP-MCNC: 3.8 G/DL (ref 3.5–5)
ALP SERPL-CCNC: 65 U/L (ref 46–116)
ALT SERPL W P-5'-P-CCNC: 36 U/L (ref 12–78)
ANION GAP SERPL CALCULATED.3IONS-SCNC: 7 MMOL/L (ref 4–13)
AST SERPL W P-5'-P-CCNC: 32 U/L (ref 5–45)
BASOPHILS # BLD AUTO: 0.02 THOUSANDS/ΜL (ref 0–0.1)
BASOPHILS NFR BLD AUTO: 1 % (ref 0–1)
BILIRUB SERPL-MCNC: 0.8 MG/DL (ref 0.2–1)
BUN SERPL-MCNC: 14 MG/DL (ref 5–25)
CALCIUM SERPL-MCNC: 8.5 MG/DL (ref 8.3–10.1)
CHLORIDE SERPL-SCNC: 106 MMOL/L (ref 100–108)
CHOLEST SERPL-MCNC: 141 MG/DL (ref 50–200)
CO2 SERPL-SCNC: 28 MMOL/L (ref 21–32)
CORTIS AM PEAK SERPL-MCNC: 11.2 UG/DL (ref 4.2–22.4)
CREAT SERPL-MCNC: 1.03 MG/DL (ref 0.6–1.3)
EOSINOPHIL # BLD AUTO: 0.12 THOUSAND/ΜL (ref 0–0.61)
EOSINOPHIL NFR BLD AUTO: 3 % (ref 0–6)
ERYTHROCYTE [DISTWIDTH] IN BLOOD BY AUTOMATED COUNT: 12.7 % (ref 11.6–15.1)
GFR SERPL CREATININE-BSD FRML MDRD: 78 ML/MIN/1.73SQ M
GLUCOSE P FAST SERPL-MCNC: 106 MG/DL (ref 65–99)
HCT VFR BLD AUTO: 44.9 % (ref 36.5–49.3)
HDLC SERPL-MCNC: 39 MG/DL
HGB BLD-MCNC: 14.5 G/DL (ref 12–17)
IMM GRANULOCYTES # BLD AUTO: 0.01 THOUSAND/UL (ref 0–0.2)
IMM GRANULOCYTES NFR BLD AUTO: 0 % (ref 0–2)
INSULIN SERPL-ACNC: 8.1 MU/L (ref 3–25)
LDLC SERPL CALC-MCNC: 83 MG/DL (ref 0–100)
LYMPHOCYTES # BLD AUTO: 1.37 THOUSANDS/ΜL (ref 0.6–4.47)
LYMPHOCYTES NFR BLD AUTO: 32 % (ref 14–44)
MCH RBC QN AUTO: 28.9 PG (ref 26.8–34.3)
MCHC RBC AUTO-ENTMCNC: 32.3 G/DL (ref 31.4–37.4)
MCV RBC AUTO: 89 FL (ref 82–98)
MONOCYTES # BLD AUTO: 0.55 THOUSAND/ΜL (ref 0.17–1.22)
MONOCYTES NFR BLD AUTO: 13 % (ref 4–12)
NEUTROPHILS # BLD AUTO: 2.25 THOUSANDS/ΜL (ref 1.85–7.62)
NEUTS SEG NFR BLD AUTO: 51 % (ref 43–75)
NONHDLC SERPL-MCNC: 102 MG/DL
NRBC BLD AUTO-RTO: 0 /100 WBCS
PLATELET # BLD AUTO: 144 THOUSANDS/UL (ref 149–390)
PMV BLD AUTO: 9 FL (ref 8.9–12.7)
POTASSIUM SERPL-SCNC: 4.2 MMOL/L (ref 3.5–5.3)
PROT SERPL-MCNC: 6.7 G/DL (ref 6.4–8.2)
PSA SERPL-MCNC: 0.5 NG/ML (ref 0–4)
RBC # BLD AUTO: 5.02 MILLION/UL (ref 3.88–5.62)
SODIUM SERPL-SCNC: 141 MMOL/L (ref 136–145)
T3FREE SERPL-MCNC: 3.54 PG/ML (ref 2.3–4.2)
T4 FREE SERPL-MCNC: 0.81 NG/DL (ref 0.76–1.46)
TRIGL SERPL-MCNC: 96 MG/DL
TSH SERPL DL<=0.05 MIU/L-ACNC: 1.62 UIU/ML (ref 0.36–3.74)
WBC # BLD AUTO: 4.32 THOUSAND/UL (ref 4.31–10.16)

## 2019-11-27 PROCEDURE — 80053 COMPREHEN METABOLIC PANEL: CPT

## 2019-11-27 PROCEDURE — 85025 COMPLETE CBC W/AUTO DIFF WBC: CPT

## 2019-11-27 PROCEDURE — 84443 ASSAY THYROID STIM HORMONE: CPT

## 2019-11-27 PROCEDURE — 80061 LIPID PANEL: CPT

## 2019-11-27 PROCEDURE — 84439 ASSAY OF FREE THYROXINE: CPT

## 2019-11-27 PROCEDURE — 84403 ASSAY OF TOTAL TESTOSTERONE: CPT

## 2019-11-27 PROCEDURE — 36415 COLL VENOUS BLD VENIPUNCTURE: CPT

## 2019-11-27 PROCEDURE — 83525 ASSAY OF INSULIN: CPT

## 2019-11-27 PROCEDURE — 82306 VITAMIN D 25 HYDROXY: CPT

## 2019-11-27 PROCEDURE — G0103 PSA SCREENING: HCPCS

## 2019-11-27 PROCEDURE — 84481 FREE ASSAY (FT-3): CPT

## 2019-11-27 PROCEDURE — 82533 TOTAL CORTISOL: CPT

## 2019-11-27 PROCEDURE — 82627 DEHYDROEPIANDROSTERONE: CPT

## 2019-11-27 PROCEDURE — 84402 ASSAY OF FREE TESTOSTERONE: CPT

## 2019-11-28 LAB — DHEA-S SERPL-MCNC: 70.7 UG/DL (ref 48.9–344.2)

## 2019-11-29 LAB
TESTOST FREE SERPL-MCNC: 5.4 PG/ML (ref 6.6–18.1)
TESTOST SERPL-MCNC: 423 NG/DL (ref 264–916)

## 2020-02-02 NOTE — PROGRESS NOTES
Cardiology Follow Up    [facility]  1960  [id number]  [address] [place] [address]    1  Coronary artery disease involving native coronary artery of native heart without angina pectoris  POCT ECG   2  Presence of drug coated stent in left circumflex coronary artery     3  Presence of bare metal stent in right coronary artery     4  Benign essential hypertension     5  Dyslipidemia  atorvastatin (LIPITOR) 80 mg tablet    Lipid Panel with Direct LDL reflex   6  Hyperlipidemia, unspecified hyperlipidemia type         Discussion/Summary:  Mr Tej Santillan is a pleasant 78-year-old male who presents to the office for routine follow-up  From a cardiac perspective he has been feeling well  He offers no cardiopulmonary complaints  His blood pressure is well controlled in the office today  He will remain on his current regimen as prescribed  His most recent lipids were reviewed  His LDL remains suboptimal despite atorvastatin and Zetia  He was intolerant of rosuvastatin due to myalgias  I have asked that he attempt to increase the dose of the atorvastatin to 80 mg daily  I will reassess his lipids in a few months  Otherwise he remains active and asymptomatic  No testing is advised  I will see him back in the office in one year  Interval History:  Mr Tej Santillan is a pleasant 78-year-old male who presents to the office today for routine follow-up  He continues to dance regularly performing various styles of dance  He does so one to two times per week  He does so without chest pain or shortness of breath  He denies symptoms of heart failure including increasing lower extremity edema, paroxysmal nocturnal dyspnea, orthopnea, acute weight gain or increasing abdominal girth  He denies lightheadedness, syncope or presyncope  He denies palpitations or symptoms of claudication      Problem List Hypertension    Coronary artery disease    Presence of bare metal stent in right coronary artery    Presence of drug coated stent in left circumflex coronary artery    Dyslipidemia        Past Medical History:   Diagnosis Date    Acute myocardial infarction ([facility] [address] )     Dysuria     Polyp of sigmoid colon     Uncomplicated alcohol abuse      Social History     Socioeconomic History    Marital status:      Spouse name: Not on file    Number of children: Not on file    Years of education: Not on file    Highest education level: Not on file   Occupational History    Not on file   Social Needs    Financial resource strain: Not on file    Food insecurity:     Worry: Not on file     Inability: Not on file    Transportation needs:     Medical: Not on file     Non-medical: Not on file   Tobacco Use    Smoking status: Former Smoker     Types: Cigarettes    Smokeless tobacco: Never Used   Substance and Sexual Activity    Alcohol use: No    Drug use: No    Sexual activity: Not on file   Lifestyle    Physical activity:     Days per week: Not on file     Minutes per session: Not on file    Stress: Not on file   Relationships    Social connections:     Talks on phone: Not on file     Gets together: Not on file     Attends [employer] service: Not on file     Active member of club or organization: Not on file     Attends meetings of clubs or organizations: Not on file     Relationship status: Not on file    Intimate partner violence:     Fear of current or ex partner: Not on file     Emotionally abused: Not on file     Physically abused: Not on file     Forced sexual activity: Not on file   Other Topics Concern    Not on file   Social History Narrative    Caffeine use    Currently  per [employer]    Denied: seeing a dentist    Uses safety equipment - seatbelts      Family History   Problem Relation Age of Onset    Angina Mother     Prostate cancer Father     Angina Maternal Grandmother      Past Surgical History:   Procedure Laterality Date    CORONARY ANGIOPLASTY WITH STENT PLACEMENT      stent to the mid and distal RCA   S/P rick placement to the mid circumflex and proximal second obtuse marginal; Last Assessed: 2/2/2017    EGD AND COLONOSCOPY N/A 7/6/2018    Procedure: EGD AND COLONOSCOPY;  Surgeon: Tomas De Los Santos MD;  Location: [facility];  Service: Gastroenterology    INGUINAL HERNIA REPAIR         Current Outpatient Medications:     Ascorbic Acid (VITAMIN C) 100 MG tablet, Take by mouth, Disp: , Rfl:     aspirin 81 MG tablet, Take 1 tablet by mouth daily, Disp: , Rfl:     atorvastatin (LIPITOR) 80 mg tablet, Take 1 tablet (80 mg total) by mouth daily, Disp: 30 tablet, Rfl: 11    ezetimibe (ZETIA) 10 mg tablet, Take 1 tablet (10 mg total) by mouth daily, Disp: 90 tablet, Rfl: 2    Garlic 10 MG CAPS, Take by mouth, Disp: , Rfl:     metoprolol succinate (TOPROL-XL) 50 mg 24 hr tablet, TAKE 1 TABLET DAILY, Disp: 90 tablet, Rfl: 2    Multiple Vitamin-Folic Acid TABS, Take by mouth, Disp: , Rfl:     niacin 500 mg tablet, Take 1 tablet by mouth daily, Disp: , Rfl:     nitroglycerin (NITROSTAT) 0 4 mg SL tablet, Place 1 tablet under the tongue every 5 (five) minutes as needed, Disp: , Rfl:     omeprazole (PriLOSEC) 40 MG capsule, Take 1 capsule (40 mg total) by mouth daily for 90 days, Disp: 90 capsule, Rfl: 3    tadalafil (CIALIS) 20 MG tablet, Take 1 tablet by mouth, Disp: , Rfl:     nystatin-triamcinolone (MYCOLOG-II) cream, Apply topically 2 (two) times a day (Patient not taking: Reported on 9/17/2019), Disp: 30 g, Rfl: 3  Allergies   Allergen Reactions    Wellbutrin Sr  [Bupropion]        Labs:     Chemistry        Component Value Date/Time     12/14/2015 1035    K 4 2 11/27/2019 0844    K 3 7 12/14/2015 1035     11/27/2019 0844     12/14/2015 1035    CO2 28 11/27/2019 0844    CO2 29 3 12/14/2015 1035    BUN 14 11/27/2019 0844    BUN 17 12/14/2015 1035    CREATININE 1 03 11/27/2019 0844    CREATININE 1 13 12/14/2015 1035        Component Value Date/Time    CALCIUM 8 5 11/27/2019 0844    CALCIUM 8 5 12/14/2015 1035    ALKPHOS 65 11/27/2019 0844    ALKPHOS 67 12/14/2015 1035    AST 32 11/27/2019 0844    AST 31 12/14/2015 1035    ALT 36 11/27/2019 0844    ALT 43 12/14/2015 1035    BILITOT 0 54 12/14/2015 1035            Lab Results   Component Value Date    CHOL 138 12/15/2015    CHOL 129 08/13/2015     Lab Results   Component Value Date    HDL 39 (L) 11/27/2019    HDL 44 04/10/2019    HDL 44 12/12/2018     Lab Results   Component Value Date    LDLCALC 83 11/27/2019    LDLCALC 96 04/10/2019    LDLCALC 96 12/12/2018     Lab Results   Component Value Date    TRIG 96 11/27/2019    TRIG 126 04/10/2019    TRIG 88 12/12/2018     No results found for: CHOLHDL    Imaging: No results found  ECG:  Normal sinus rhythm, normal ECG      Review of Systems   Cardiovascular: Negative for chest pain, claudication, cyanosis, dyspnea on exertion, irregular heartbeat and leg swelling  All other systems reviewed and are negative  Vitals:    02/03/20 1453   BP: 110/72   Pulse: 71     Vitals:    02/03/20 1453   Weight: 98 3 kg (216 lb 11 4 oz)     Height: 5' 9" (175 3 cm)   Body mass index is 32 kg/m²      Physical Exam:  General:  Alert and cooperative, appears stated age  HEENT:  PERRLA, EOMI, no scleral icterus, no conjunctival pallor  Neck:  No lymphadenopathy, no thyromegaly, no carotid bruits, no elevated JVP  Heart:  Regular rate and rhythm, normal S1/S2, no S3/S4, no murmur  Lungs:  Clear to auscultation bilaterally   Abdomen:  Soft, non-tender, positive bowel sounds, no rebound or guarding,   no organomegaly   Extremities:  No clubbing, cyanosis or edema   Vascular:  2+ pedal pulses  Skin:  No rashes or lesions on exposed skin  Neurologic:  Cranial nerves II-XII grossly intact without focal deficits

## 2020-02-03 ENCOUNTER — OFFICE VISIT (OUTPATIENT)
Dept: CARDIOLOGY CLINIC | Facility: HOSPITAL | Age: 60
End: 2020-02-03
Payer: COMMERCIAL

## 2020-02-03 VITALS
BODY MASS INDEX: 32.1 KG/M2 | DIASTOLIC BLOOD PRESSURE: 72 MMHG | HEIGHT: 69 IN | SYSTOLIC BLOOD PRESSURE: 110 MMHG | HEART RATE: 71 BPM | WEIGHT: 216.71 LBS

## 2020-02-03 DIAGNOSIS — Z95.5 PRESENCE OF DRUG COATED STENT IN LEFT CIRCUMFLEX CORONARY ARTERY: ICD-10-CM

## 2020-02-03 DIAGNOSIS — I10 BENIGN ESSENTIAL HYPERTENSION: ICD-10-CM

## 2020-02-03 DIAGNOSIS — Z95.5 PRESENCE OF BARE METAL STENT IN RIGHT CORONARY ARTERY: ICD-10-CM

## 2020-02-03 DIAGNOSIS — I25.10 CORONARY ARTERY DISEASE INVOLVING NATIVE CORONARY ARTERY OF NATIVE HEART WITHOUT ANGINA PECTORIS: Primary | ICD-10-CM

## 2020-02-03 DIAGNOSIS — E78.5 HYPERLIPIDEMIA, UNSPECIFIED HYPERLIPIDEMIA TYPE: ICD-10-CM

## 2020-02-03 DIAGNOSIS — E78.5 DYSLIPIDEMIA: ICD-10-CM

## 2020-02-03 PROCEDURE — 3074F SYST BP LT 130 MM HG: CPT | Performed by: INTERNAL MEDICINE

## 2020-02-03 PROCEDURE — 93000 ELECTROCARDIOGRAM COMPLETE: CPT | Performed by: INTERNAL MEDICINE

## 2020-02-03 PROCEDURE — 3008F BODY MASS INDEX DOCD: CPT | Performed by: INTERNAL MEDICINE

## 2020-02-03 PROCEDURE — 1036F TOBACCO NON-USER: CPT | Performed by: INTERNAL MEDICINE

## 2020-02-03 PROCEDURE — 99214 OFFICE O/P EST MOD 30 MIN: CPT | Performed by: INTERNAL MEDICINE

## 2020-02-03 PROCEDURE — 3078F DIAST BP <80 MM HG: CPT | Performed by: INTERNAL MEDICINE

## 2020-02-03 RX ORDER — ATORVASTATIN CALCIUM 80 MG/1
80 TABLET, FILM COATED ORAL DAILY
Qty: 30 TABLET | Refills: 11 | Status: SHIPPED | OUTPATIENT
Start: 2020-02-03 | End: 2020-10-13 | Stop reason: SDUPTHER

## 2020-03-13 DIAGNOSIS — E78.5 DYSLIPIDEMIA: ICD-10-CM

## 2020-03-13 DIAGNOSIS — I10 HYPERTENSION, UNSPECIFIED TYPE: ICD-10-CM

## 2020-03-13 RX ORDER — EZETIMIBE 10 MG/1
TABLET ORAL
Qty: 90 TABLET | Refills: 3 | Status: SHIPPED | OUTPATIENT
Start: 2020-03-13 | End: 2021-08-11 | Stop reason: SDUPTHER

## 2020-03-14 RX ORDER — METOPROLOL SUCCINATE 50 MG/1
TABLET, EXTENDED RELEASE ORAL
Qty: 90 TABLET | Refills: 3 | Status: SHIPPED | OUTPATIENT
Start: 2020-03-14 | End: 2021-04-05

## 2020-06-06 DIAGNOSIS — I25.10 CORONARY ARTERY DISEASE INVOLVING NATIVE HEART WITHOUT ANGINA PECTORIS, UNSPECIFIED VESSEL OR LESION TYPE: Primary | ICD-10-CM

## 2020-06-07 RX ORDER — NITROGLYCERIN 0.4 MG/1
TABLET SUBLINGUAL
Qty: 25 TABLET | Refills: 0 | Status: SHIPPED | OUTPATIENT
Start: 2020-06-07

## 2020-06-14 DIAGNOSIS — L30.9 DERMATITIS: ICD-10-CM

## 2020-06-29 ENCOUNTER — TELEPHONE (OUTPATIENT)
Dept: INTERNAL MEDICINE CLINIC | Facility: CLINIC | Age: 60
End: 2020-06-29

## 2020-06-29 ENCOUNTER — TELEMEDICINE (OUTPATIENT)
Dept: INTERNAL MEDICINE CLINIC | Facility: CLINIC | Age: 60
End: 2020-06-29
Payer: COMMERCIAL

## 2020-06-29 DIAGNOSIS — Z00.00 ANNUAL PHYSICAL EXAM: Primary | ICD-10-CM

## 2020-06-29 PROCEDURE — 99396 PREV VISIT EST AGE 40-64: CPT | Performed by: INTERNAL MEDICINE

## 2020-06-30 ENCOUNTER — APPOINTMENT (OUTPATIENT)
Dept: LAB | Facility: HOSPITAL | Age: 60
End: 2020-06-30
Attending: INTERNAL MEDICINE
Payer: COMMERCIAL

## 2020-06-30 DIAGNOSIS — E78.5 DYSLIPIDEMIA: ICD-10-CM

## 2020-06-30 LAB
CHOLEST SERPL-MCNC: 157 MG/DL (ref 50–200)
HDLC SERPL-MCNC: 42 MG/DL
LDLC SERPL CALC-MCNC: 95 MG/DL (ref 0–100)
TRIGL SERPL-MCNC: 102 MG/DL

## 2020-06-30 PROCEDURE — 36415 COLL VENOUS BLD VENIPUNCTURE: CPT

## 2020-06-30 PROCEDURE — 80061 LIPID PANEL: CPT

## 2020-10-13 DIAGNOSIS — E78.5 DYSLIPIDEMIA: ICD-10-CM

## 2020-10-13 RX ORDER — ATORVASTATIN CALCIUM 80 MG/1
80 TABLET, FILM COATED ORAL DAILY
Qty: 30 TABLET | Refills: 11 | Status: SHIPPED | OUTPATIENT
Start: 2020-10-13 | End: 2020-10-15 | Stop reason: SDUPTHER

## 2020-10-15 DIAGNOSIS — E78.5 DYSLIPIDEMIA: ICD-10-CM

## 2020-10-15 RX ORDER — ATORVASTATIN CALCIUM 80 MG/1
80 TABLET, FILM COATED ORAL DAILY
Qty: 90 TABLET | Refills: 3 | Status: SHIPPED | OUTPATIENT
Start: 2020-10-15 | End: 2021-08-11 | Stop reason: SDUPTHER

## 2020-11-16 ENCOUNTER — HOSPITAL ENCOUNTER (EMERGENCY)
Facility: HOSPITAL | Age: 60
Discharge: HOME | End: 2020-11-17
Attending: EMERGENCY MEDICINE
Payer: COMMERCIAL

## 2020-11-16 ENCOUNTER — APPOINTMENT (EMERGENCY)
Dept: RADIOLOGY | Facility: HOSPITAL | Age: 60
End: 2020-11-16
Payer: COMMERCIAL

## 2020-11-16 DIAGNOSIS — M25.562 ACUTE PAIN OF LEFT KNEE: ICD-10-CM

## 2020-11-16 DIAGNOSIS — R07.9 CHEST PAIN, UNSPECIFIED TYPE: Primary | ICD-10-CM

## 2020-11-16 LAB
ALBUMIN SERPL BCP-MCNC: 4.2 G/DL (ref 3.5–5)
ALP SERPL-CCNC: 71 U/L (ref 46–116)
ALT SERPL W P-5'-P-CCNC: 56 U/L (ref 12–78)
ANION GAP SERPL CALCULATED.3IONS-SCNC: 7 MMOL/L (ref 4–13)
APTT PPP: 28 SECONDS (ref 23–37)
AST SERPL W P-5'-P-CCNC: 30 U/L (ref 5–45)
BASOPHILS # BLD AUTO: 0.03 THOUSANDS/ΜL (ref 0–0.1)
BASOPHILS NFR BLD AUTO: 0 % (ref 0–1)
BILIRUB SERPL-MCNC: 0.6 MG/DL (ref 0.2–1)
BUN SERPL-MCNC: 22 MG/DL (ref 5–25)
CALCIUM SERPL-MCNC: 8.6 MG/DL (ref 8.3–10.1)
CHLORIDE SERPL-SCNC: 103 MMOL/L (ref 100–108)
CO2 SERPL-SCNC: 29 MMOL/L (ref 21–32)
CREAT SERPL-MCNC: 1.03 MG/DL (ref 0.6–1.3)
EOSINOPHIL # BLD AUTO: 0.15 THOUSAND/ΜL (ref 0–0.61)
EOSINOPHIL NFR BLD AUTO: 2 % (ref 0–6)
ERYTHROCYTE [DISTWIDTH] IN BLOOD BY AUTOMATED COUNT: 13 % (ref 11.6–15.1)
GFR SERPL CREATININE-BSD FRML MDRD: 77 ML/MIN/1.73SQ M
GLUCOSE SERPL-MCNC: 94 MG/DL (ref 65–140)
HCT VFR BLD AUTO: 44.6 % (ref 36.5–49.3)
HGB BLD-MCNC: 14.8 G/DL (ref 12–17)
IMM GRANULOCYTES # BLD AUTO: 0.02 THOUSAND/UL (ref 0–0.2)
IMM GRANULOCYTES NFR BLD AUTO: 0 % (ref 0–2)
INR PPP: 1.02 (ref 0.84–1.19)
LIPASE SERPL-CCNC: 153 U/L (ref 73–393)
LYMPHOCYTES # BLD AUTO: 2.16 THOUSANDS/ΜL (ref 0.6–4.47)
LYMPHOCYTES NFR BLD AUTO: 31 % (ref 14–44)
MAGNESIUM SERPL-MCNC: 2.2 MG/DL (ref 1.6–2.6)
MCH RBC QN AUTO: 28.8 PG (ref 26.8–34.3)
MCHC RBC AUTO-ENTMCNC: 33.2 G/DL (ref 31.4–37.4)
MCV RBC AUTO: 87 FL (ref 82–98)
MONOCYTES # BLD AUTO: 0.75 THOUSAND/ΜL (ref 0.17–1.22)
MONOCYTES NFR BLD AUTO: 11 % (ref 4–12)
NEUTROPHILS # BLD AUTO: 3.97 THOUSANDS/ΜL (ref 1.85–7.62)
NEUTS SEG NFR BLD AUTO: 56 % (ref 43–75)
NRBC BLD AUTO-RTO: 0 /100 WBCS
NT-PROBNP SERPL-MCNC: 72 PG/ML
PLATELET # BLD AUTO: 144 THOUSANDS/UL (ref 149–390)
PMV BLD AUTO: 9.6 FL (ref 8.9–12.7)
POTASSIUM SERPL-SCNC: 3.8 MMOL/L (ref 3.5–5.3)
PROT SERPL-MCNC: 7.4 G/DL (ref 6.4–8.2)
PROTHROMBIN TIME: 13.2 SECONDS (ref 11.6–14.5)
RBC # BLD AUTO: 5.13 MILLION/UL (ref 3.88–5.62)
SODIUM SERPL-SCNC: 139 MMOL/L (ref 136–145)
TROPONIN I SERPL-MCNC: <0.02 NG/ML
WBC # BLD AUTO: 7.08 THOUSAND/UL (ref 4.31–10.16)

## 2020-11-16 PROCEDURE — 80053 COMPREHEN METABOLIC PANEL: CPT | Performed by: EMERGENCY MEDICINE

## 2020-11-16 PROCEDURE — 71045 X-RAY EXAM CHEST 1 VIEW: CPT

## 2020-11-16 PROCEDURE — 83735 ASSAY OF MAGNESIUM: CPT | Performed by: EMERGENCY MEDICINE

## 2020-11-16 PROCEDURE — 85730 THROMBOPLASTIN TIME PARTIAL: CPT | Performed by: EMERGENCY MEDICINE

## 2020-11-16 PROCEDURE — 85025 COMPLETE CBC W/AUTO DIFF WBC: CPT | Performed by: EMERGENCY MEDICINE

## 2020-11-16 PROCEDURE — 84484 ASSAY OF TROPONIN QUANT: CPT | Performed by: EMERGENCY MEDICINE

## 2020-11-16 PROCEDURE — 85610 PROTHROMBIN TIME: CPT | Performed by: EMERGENCY MEDICINE

## 2020-11-16 PROCEDURE — 93005 ELECTROCARDIOGRAM TRACING: CPT

## 2020-11-16 PROCEDURE — 99284 EMERGENCY DEPT VISIT MOD MDM: CPT

## 2020-11-16 PROCEDURE — 73564 X-RAY EXAM KNEE 4 OR MORE: CPT

## 2020-11-16 PROCEDURE — 99284 EMERGENCY DEPT VISIT MOD MDM: CPT | Performed by: EMERGENCY MEDICINE

## 2020-11-16 PROCEDURE — 83880 ASSAY OF NATRIURETIC PEPTIDE: CPT | Performed by: EMERGENCY MEDICINE

## 2020-11-16 PROCEDURE — 83690 ASSAY OF LIPASE: CPT | Performed by: EMERGENCY MEDICINE

## 2020-11-16 RX ORDER — SODIUM CHLORIDE 9 MG/ML
3 INJECTION INTRAVENOUS
Status: DISCONTINUED | OUTPATIENT
Start: 2020-11-16 | End: 2020-11-17 | Stop reason: HOSPADM

## 2020-11-17 ENCOUNTER — OFFICE VISIT (OUTPATIENT)
Dept: INTERNAL MEDICINE CLINIC | Facility: CLINIC | Age: 60
End: 2020-11-17
Payer: COMMERCIAL

## 2020-11-17 VITALS
SYSTOLIC BLOOD PRESSURE: 126 MMHG | WEIGHT: 207.13 LBS | DIASTOLIC BLOOD PRESSURE: 78 MMHG | HEIGHT: 69 IN | TEMPERATURE: 96.1 F | BODY MASS INDEX: 30.68 KG/M2

## 2020-11-17 VITALS
OXYGEN SATURATION: 97 % | DIASTOLIC BLOOD PRESSURE: 56 MMHG | WEIGHT: 212.74 LBS | TEMPERATURE: 98 F | HEART RATE: 59 BPM | RESPIRATION RATE: 16 BRPM | BODY MASS INDEX: 31.42 KG/M2 | SYSTOLIC BLOOD PRESSURE: 105 MMHG

## 2020-11-17 DIAGNOSIS — M25.562 ACUTE PAIN OF LEFT KNEE: Primary | ICD-10-CM

## 2020-11-17 DIAGNOSIS — I10 BENIGN ESSENTIAL HYPERTENSION: ICD-10-CM

## 2020-11-17 DIAGNOSIS — I25.10 CORONARY ARTERY DISEASE INVOLVING NATIVE CORONARY ARTERY OF NATIVE HEART WITHOUT ANGINA PECTORIS: ICD-10-CM

## 2020-11-17 LAB
ATRIAL RATE: 59 BPM
P AXIS: 44 DEGREES
PR INTERVAL: 186 MS
QRS AXIS: 61 DEGREES
QRSD INTERVAL: 88 MS
QT INTERVAL: 414 MS
QTC INTERVAL: 409 MS
T WAVE AXIS: 45 DEGREES
VENTRICULAR RATE: 59 BPM

## 2020-11-17 PROCEDURE — 3074F SYST BP LT 130 MM HG: CPT | Performed by: INTERNAL MEDICINE

## 2020-11-17 PROCEDURE — 93010 ELECTROCARDIOGRAM REPORT: CPT | Performed by: INTERNAL MEDICINE

## 2020-11-17 PROCEDURE — 99214 OFFICE O/P EST MOD 30 MIN: CPT | Performed by: INTERNAL MEDICINE

## 2020-11-17 PROCEDURE — 3008F BODY MASS INDEX DOCD: CPT | Performed by: INTERNAL MEDICINE

## 2020-11-17 PROCEDURE — 1036F TOBACCO NON-USER: CPT | Performed by: INTERNAL MEDICINE

## 2020-11-17 PROCEDURE — 3078F DIAST BP <80 MM HG: CPT | Performed by: INTERNAL MEDICINE

## 2021-02-15 ENCOUNTER — TELEPHONE (OUTPATIENT)
Dept: CARDIOLOGY CLINIC | Facility: HOSPITAL | Age: 61
End: 2021-02-15

## 2021-02-15 NOTE — TELEPHONE ENCOUNTER
Attempted to contact Jo Schwab multiple times to schedule his next appointment with cardiology  A letter has been sent to have him contact our office

## 2021-03-10 DIAGNOSIS — Z23 ENCOUNTER FOR IMMUNIZATION: ICD-10-CM

## 2021-04-02 ENCOUNTER — IMMUNIZATIONS (OUTPATIENT)
Dept: FAMILY MEDICINE CLINIC | Facility: HOSPITAL | Age: 61
End: 2021-04-02

## 2021-04-02 DIAGNOSIS — Z23 ENCOUNTER FOR IMMUNIZATION: Primary | ICD-10-CM

## 2021-04-02 PROCEDURE — 91301 SARS-COV-2 / COVID-19 MRNA VACCINE (MODERNA) 100 MCG: CPT

## 2021-04-02 PROCEDURE — 0011A SARS-COV-2 / COVID-19 MRNA VACCINE (MODERNA) 100 MCG: CPT

## 2021-04-04 DIAGNOSIS — I10 HYPERTENSION, UNSPECIFIED TYPE: ICD-10-CM

## 2021-04-05 RX ORDER — METOPROLOL SUCCINATE 50 MG/1
TABLET, EXTENDED RELEASE ORAL
Qty: 90 TABLET | Refills: 3 | Status: SHIPPED | OUTPATIENT
Start: 2021-04-05 | End: 2021-08-11 | Stop reason: SDUPTHER

## 2021-05-03 ENCOUNTER — IMMUNIZATIONS (OUTPATIENT)
Dept: FAMILY MEDICINE CLINIC | Facility: HOSPITAL | Age: 61
End: 2021-05-03

## 2021-05-03 DIAGNOSIS — Z23 ENCOUNTER FOR IMMUNIZATION: Primary | ICD-10-CM

## 2021-05-03 PROCEDURE — 0012A SARS-COV-2 / COVID-19 MRNA VACCINE (MODERNA) 100 MCG: CPT

## 2021-05-03 PROCEDURE — 91301 SARS-COV-2 / COVID-19 MRNA VACCINE (MODERNA) 100 MCG: CPT

## 2021-06-08 ENCOUNTER — RA CDI HCC (OUTPATIENT)
Dept: OTHER | Facility: HOSPITAL | Age: 61
End: 2021-06-08

## 2021-06-08 NOTE — PROGRESS NOTES
[facility]          Chart reviewed, no opportunity found: CHART REVIEWED, NO OPPORTUNITY FOUND              Patients insurance company:  [employer] [facility] (Medicare Advantage and Commercial)

## 2021-07-15 ENCOUNTER — RA CDI HCC (OUTPATIENT)
Dept: OTHER | Facility: HOSPITAL | Age: 61
End: 2021-07-15

## 2021-07-15 NOTE — PROGRESS NOTES
[facility]          Chart reviewed, no opportunity found: CHART REVIEWED, NO OPPORTUNITY FOUND                     Patients insurance company:  [employer] [facility] (Medicare Advantage and Commercial)

## 2021-07-23 ENCOUNTER — OFFICE VISIT (OUTPATIENT)
Dept: INTERNAL MEDICINE CLINIC | Facility: CLINIC | Age: 61
End: 2021-07-23
Payer: COMMERCIAL

## 2021-07-23 VITALS
HEIGHT: 69 IN | BODY MASS INDEX: 30.57 KG/M2 | OXYGEN SATURATION: 98 % | HEART RATE: 87 BPM | WEIGHT: 206.38 LBS | SYSTOLIC BLOOD PRESSURE: 126 MMHG | TEMPERATURE: 97.8 F | DIASTOLIC BLOOD PRESSURE: 78 MMHG

## 2021-07-23 DIAGNOSIS — E55.9 VITAMIN D DEFICIENCY: ICD-10-CM

## 2021-07-23 DIAGNOSIS — Z00.00 ANNUAL PHYSICAL EXAM: Primary | ICD-10-CM

## 2021-07-23 PROCEDURE — 1036F TOBACCO NON-USER: CPT | Performed by: INTERNAL MEDICINE

## 2021-07-23 PROCEDURE — 3008F BODY MASS INDEX DOCD: CPT | Performed by: INTERNAL MEDICINE

## 2021-07-23 PROCEDURE — 99396 PREV VISIT EST AGE 40-64: CPT | Performed by: INTERNAL MEDICINE

## 2021-07-23 PROCEDURE — 3725F SCREEN DEPRESSION PERFORMED: CPT | Performed by: INTERNAL MEDICINE

## 2021-07-23 NOTE — PATIENT INSTRUCTIONS

## 2021-07-23 NOTE — PROGRESS NOTES
ADULT ANNUAL [facility]INE AT [facility]    NAME: Komal Amos  AGE: 56 y o  SEX: male  : 1960     DATE: 2021     Assessment and Plan:     Problem List Items Addressed This Visit        Other    Annual physical exam - Primary    Relevant Orders    Lipid panel    Comprehensive metabolic panel      Other Visit Diagnoses     Vitamin D deficiency        Relevant Orders    Vitamin D 25 hydroxy      Rx for fbw  Exercise and adequate hydration   Eye exam  Annual/prn       Immunizations and preventive care screenings were discussed with patient today  Appropriate education was printed on patient's after visit summary  Counseling:  · Exercise: the importance of regular exercise/physical activity was discussed  Recommend exercise 3-5 times per week for at least 30 minutes  BMI Counseling: Body mass index is 30 48 kg/m²  The BMI is above normal  Nutrition recommendations include moderation in carbohydrate intake and increasing intake of lean protein  Exercise recommendations include exercising 3-5 times per week  No pharmacotherapy was ordered  No follow-ups on file  Chief Complaint:     Chief Complaint   Patient presents with    Annual Exam      History of Present Illness:     Adult Annual Physical   Patient here for a comprehensive physical exam  The patient reports problems - Pt has had various intermittnet joint sxs Not limiting but noting more Still working   Diet and Physical Activity  · Diet/Nutrition: well balanced diet  · Exercise: no formal exercise  Depression Screening  PHQ-9 Depression Screening    PHQ-9:   Frequency of the following problems over the past two weeks:      Little interest or pleasure in doing things: 0 - not at all  Feeling down, depressed, or hopeless: 0 - not at all  PHQ-2 Score: 0       General Health  · Sleep: gets 7-8 hours of sleep on average     · Hearing: normal - bilateral   · Vision: no vision problems and wears glasses  · Dental: regular dental visits   Health  · Symptoms include: none     Review of Systems:     Review of Systems   Constitutional: Negative  Negative for chills and fever  HENT: Negative  Eyes: Negative for visual disturbance  Respiratory: Negative  Negative for cough and shortness of breath  Cardiovascular: Negative  Gastrointestinal: Negative  Genitourinary: Negative for dysuria, flank pain and frequency  Musculoskeletal: Negative  Neurological: Negative for dizziness  Psychiatric/Behavioral: Negative for sleep disturbance  The patient is not nervous/anxious  Past Medical History:     Past Medical History:   Diagnosis Date    Acute myocardial infarction ([address] )     Dysuria     Polyp of sigmoid colon     Uncomplicated alcohol abuse       Past Surgical History:     Past Surgical History:   Procedure Laterality Date    CORONARY ANGIOPLASTY WITH STENT PLACEMENT      stent to the mid and distal RCA   S/P rick placement to the mid circumflex and proximal second obtuse marginal; Last Assessed: 2/2/2017    EGD AND COLONOSCOPY N/A 7/6/2018    Procedure: EGD AND COLONOSCOPY;  Surgeon: Brenna York MD;  Location: [facility];  Service: Gastroenterology    INGUINAL HERNIA REPAIR        Family History:     Family History   Problem Relation Age of Onset    Angina Mother     Prostate cancer Father     Angina Maternal Grandmother     Coronary artery disease Brother       Social History:     Social History     Socioeconomic History    Marital status:      Spouse name: None    Number of children: None    Years of education: None    Highest education level: None   Occupational History    None   Tobacco Use    Smoking status: Former Smoker     Types: Cigarettes    Smokeless tobacco: Never Used   Vaping Use    Vaping Use: Never used   Substance and Sexual Activity    Alcohol use: No    Drug use: No    Sexual activity: None   Other Topics Concern    None   Social History Narrative    Caffeine use    Currently  per [employer]    Denied: seeing a dentist    Uses safety equipment - seatbelts     Social Determinants of Health     Financial Resource Strain:     Difficulty of Paying Living Expenses:    Food Insecurity:     Worried About [address] in the Last Year:     [address] in the Last Year:    Transportation Needs:     Lack of Transportation (Medical):  Lack of Transportation (Non-Medical):    Physical Activity:     Days of Exercise per Week:     Minutes of Exercise per Session:    Stress:     Feeling of Stress :    Social Connections:     Frequency of Communication with Friends and Family:     Frequency of Social Gatherings with Friends and Family:     Attends [employer] Services:     Active Member of Clubs or Organizations:     Attends Club or Organization Meetings:     Marital Status:    Intimate Partner Violence:     Fear of Current or Ex-Partner:     Emotionally Abused:     Physically Abused:     Sexually Abused:       Current Medications:     Current Outpatient Medications   Medication Sig Dispense Refill    Ascorbic Acid (VITAMIN C) 100 MG tablet Take by mouth      aspirin 81 MG tablet Take 1 tablet by mouth daily      atorvastatin (LIPITOR) 80 mg tablet Take 1 tablet (80 mg total) by mouth daily 90 tablet 3    ezetimibe (ZETIA) 10 mg tablet TAKE 1 TABLET DAILY 90 tablet 3    metoprolol succinate (TOPROL-XL) 50 mg 24 hr tablet TAKE 1 TABLET DAILY 90 tablet 3    Multiple Vitamin-Folic Acid TABS Take by mouth      nitroglycerin (NITROSTAT) 0 4 mg SL tablet DISSOLVE ONE TABLET UNDER THE TONGUE EVERY 5 MINUTES AS NEEDED FOR CHEST PAIN  DO NOT EXCEED A TOTAL OF 3 DOSES IN 15 MINUTES 25 tablet 0    tadalafil (CIALIS) 20 MG tablet Take 1 tablet by mouth       No current facility-administered medications for this visit  Allergies:      Allergies   Allergen Reactions    Wellbutrin Sr [Bupropion]       Physical Exam:     /78   Pulse 87   Temp 97 8 °F (36 6 °C) (Temporal)   Ht 5' 9" (1 753 m)   Wt 93 6 kg (206 lb 6 oz)   SpO2 98%   BMI 30 48 kg/m²     Physical Exam  Vitals reviewed  Constitutional:       General: He is not in acute distress  Appearance: Normal appearance  He is not ill-appearing, toxic-appearing or diaphoretic  HENT:      Head: Normocephalic and atraumatic  Right Ear: Tympanic membrane, ear canal and external ear normal  There is no impacted cerumen  Left Ear: Tympanic membrane, ear canal and external ear normal       Nose: Nose normal       Mouth/Throat:      Mouth: Mucous membranes are dry  Eyes:      General: No scleral icterus  Extraocular Movements: Extraocular movements intact  Conjunctiva/sclera: Conjunctivae normal       Pupils: Pupils are equal, round, and reactive to light  Cardiovascular:      Rate and Rhythm: Normal rate and regular rhythm  Pulses: Normal pulses  Heart sounds: Normal heart sounds  No murmur heard  Pulmonary:      Effort: Pulmonary effort is normal  No respiratory distress  Breath sounds: Normal breath sounds  No wheezing  Abdominal:      General: Bowel sounds are normal  There is no distension  Palpations: Abdomen is soft  Tenderness: There is no abdominal tenderness  Musculoskeletal:         General: Normal range of motion  Cervical back: Normal range of motion and neck supple  No rigidity or tenderness  Right lower leg: No edema  Left lower leg: No edema  Lymphadenopathy:      Cervical: No cervical adenopathy  Skin:     General: Skin is dry  Coloration: Skin is not jaundiced or pale  Findings: No erythema  Neurological:      General: No focal deficit present  Mental Status: He is alert and oriented to person, place, and time  Mental status is at baseline  Cranial Nerves: No cranial nerve deficit  Sensory: No sensory deficit  Coordination: Coordination normal    Psychiatric:         Mood and Affect: Mood normal          Behavior: Behavior normal          Thought Content:  Thought content normal          Judgment: Judgment normal           Edmar Liang DO  [facility]

## 2021-07-27 ENCOUNTER — APPOINTMENT (OUTPATIENT)
Dept: LAB | Facility: HOSPITAL | Age: 61
End: 2021-07-27
Attending: INTERNAL MEDICINE
Payer: COMMERCIAL

## 2021-07-27 DIAGNOSIS — E55.9 VITAMIN D DEFICIENCY: ICD-10-CM

## 2021-07-27 DIAGNOSIS — Z00.00 ANNUAL PHYSICAL EXAM: ICD-10-CM

## 2021-07-27 LAB
25(OH)D3 SERPL-MCNC: 21.5 NG/ML (ref 30–100)
ALBUMIN SERPL BCP-MCNC: 3.9 G/DL (ref 3.5–5)
ALP SERPL-CCNC: 76 U/L (ref 46–116)
ALT SERPL W P-5'-P-CCNC: 38 U/L (ref 12–78)
ANION GAP SERPL CALCULATED.3IONS-SCNC: 8 MMOL/L (ref 4–13)
AST SERPL W P-5'-P-CCNC: 22 U/L (ref 5–45)
BILIRUB SERPL-MCNC: 0.65 MG/DL (ref 0.2–1)
BUN SERPL-MCNC: 18 MG/DL (ref 5–25)
CALCIUM SERPL-MCNC: 8.8 MG/DL (ref 8.3–10.1)
CHLORIDE SERPL-SCNC: 107 MMOL/L (ref 100–108)
CHOLEST SERPL-MCNC: 150 MG/DL (ref 50–200)
CO2 SERPL-SCNC: 29 MMOL/L (ref 21–32)
CREAT SERPL-MCNC: 1.05 MG/DL (ref 0.6–1.3)
GFR SERPL CREATININE-BSD FRML MDRD: 76 ML/MIN/1.73SQ M
GLUCOSE P FAST SERPL-MCNC: 113 MG/DL (ref 65–99)
HDLC SERPL-MCNC: 44 MG/DL
LDLC SERPL CALC-MCNC: 81 MG/DL (ref 0–100)
NONHDLC SERPL-MCNC: 106 MG/DL
POTASSIUM SERPL-SCNC: 4.1 MMOL/L (ref 3.5–5.3)
PROT SERPL-MCNC: 6.9 G/DL (ref 6.4–8.2)
SODIUM SERPL-SCNC: 144 MMOL/L (ref 136–145)
TRIGL SERPL-MCNC: 125 MG/DL

## 2021-07-27 PROCEDURE — 82306 VITAMIN D 25 HYDROXY: CPT

## 2021-07-27 PROCEDURE — 80061 LIPID PANEL: CPT

## 2021-07-27 PROCEDURE — 80053 COMPREHEN METABOLIC PANEL: CPT

## 2021-07-27 PROCEDURE — 36415 COLL VENOUS BLD VENIPUNCTURE: CPT

## 2021-08-11 DIAGNOSIS — E78.5 DYSLIPIDEMIA: ICD-10-CM

## 2021-08-11 DIAGNOSIS — I10 HYPERTENSION, UNSPECIFIED TYPE: ICD-10-CM

## 2021-08-11 RX ORDER — EZETIMIBE 10 MG/1
10 TABLET ORAL DAILY
Qty: 90 TABLET | Refills: 3 | Status: SHIPPED | OUTPATIENT
Start: 2021-08-11

## 2021-08-11 RX ORDER — ATORVASTATIN CALCIUM 80 MG/1
80 TABLET, FILM COATED ORAL DAILY
Qty: 90 TABLET | Refills: 3 | Status: SHIPPED | OUTPATIENT
Start: 2021-08-11 | End: 2021-09-29

## 2021-08-11 RX ORDER — METOPROLOL SUCCINATE 50 MG/1
50 TABLET, EXTENDED RELEASE ORAL DAILY
Qty: 90 TABLET | Refills: 3 | Status: SHIPPED | OUTPATIENT
Start: 2021-08-11

## 2021-09-29 ENCOUNTER — OFFICE VISIT (OUTPATIENT)
Dept: CARDIOLOGY CLINIC | Facility: HOSPITAL | Age: 61
End: 2021-09-29
Payer: COMMERCIAL

## 2021-09-29 VITALS
BODY MASS INDEX: 30.81 KG/M2 | HEART RATE: 79 BPM | DIASTOLIC BLOOD PRESSURE: 78 MMHG | WEIGHT: 208 LBS | HEIGHT: 69 IN | SYSTOLIC BLOOD PRESSURE: 128 MMHG

## 2021-09-29 DIAGNOSIS — I25.10 CORONARY ARTERY DISEASE INVOLVING NATIVE CORONARY ARTERY OF NATIVE HEART WITHOUT ANGINA PECTORIS: Primary | ICD-10-CM

## 2021-09-29 DIAGNOSIS — I10 BENIGN ESSENTIAL HYPERTENSION: ICD-10-CM

## 2021-09-29 DIAGNOSIS — Z95.5 PRESENCE OF BARE METAL STENT IN RIGHT CORONARY ARTERY: ICD-10-CM

## 2021-09-29 DIAGNOSIS — Z95.5 PRESENCE OF DRUG COATED STENT IN LEFT CIRCUMFLEX CORONARY ARTERY: ICD-10-CM

## 2021-09-29 DIAGNOSIS — E78.5 DYSLIPIDEMIA: ICD-10-CM

## 2021-09-29 PROCEDURE — 3008F BODY MASS INDEX DOCD: CPT | Performed by: INTERNAL MEDICINE

## 2021-09-29 PROCEDURE — 3078F DIAST BP <80 MM HG: CPT | Performed by: INTERNAL MEDICINE

## 2021-09-29 PROCEDURE — 3074F SYST BP LT 130 MM HG: CPT | Performed by: INTERNAL MEDICINE

## 2021-09-29 PROCEDURE — 1036F TOBACCO NON-USER: CPT | Performed by: INTERNAL MEDICINE

## 2021-09-29 PROCEDURE — 93000 ELECTROCARDIOGRAM COMPLETE: CPT | Performed by: INTERNAL MEDICINE

## 2021-09-29 PROCEDURE — 99214 OFFICE O/P EST MOD 30 MIN: CPT | Performed by: INTERNAL MEDICINE

## 2021-09-29 RX ORDER — ROSUVASTATIN CALCIUM 40 MG/1
40 TABLET, COATED ORAL DAILY
Qty: 90 TABLET | Refills: 3 | Status: SHIPPED | OUTPATIENT
Start: 2021-09-29 | End: 2022-01-28 | Stop reason: SDUPTHER

## 2021-09-29 NOTE — PROGRESS NOTES
Cardiology Follow Up    Molly Carrasquillo  1960  [id number]  [address] [address]    1  Coronary artery disease involving native coronary artery of native heart without angina pectoris  POCT ECG   2  Presence of bare metal stent in right coronary artery     3  Presence of drug coated stent in left circumflex coronary artery     4  Benign essential hypertension     5  Dyslipidemia  rosuvastatin (CRESTOR) 40 MG tablet    Lipid Panel with Direct LDL reflex       Discussion/Summary:  Mr Kaleigh Fernando is a pleasant 15-year-old male who presents to the office for routine follow-up  From a cardiac perspective he has been feeling well  He offers no cardiopulmonary complaints  His blood pressure is well controlled in the office today on his current antihypertensive medication regimen to which no changes were advised  A low salt diet was reinforced  His most recent lipids were reviewed  His LDL remains sub-optimal despite an increase in the atorvastatin dose and Zetia  He did have myalgias with rosuvastatin in the past but he is willing to re-attempt this  I also advised that he try coenzyme Q10  If he is able to tolerate the rosuvastatin I will reassess his lipids in a few months  Otherwise he remains active and asymptomatic  No testing is advised  I will see him back in the office in one year or sooner if deemed necessary  Interval History:  Mr Kaleigh Fernando is a pleasant 15-year-old male who presents to the office today for routine follow-up  Since his last visit he has been feeling well  He has not been dancing on a regular basis as he had been  He is active around his home and in his yard  With the activity he performs he feels well  He denies any exertional chest pain or shortness of breath    He denies any signs or symptoms of congestive heart failure including increasing lower extremity edema, paroxysmal nocturnal dyspnea, orthopnea, acute weight gain or increasing abdominal girth  He denies lightheadedness, syncope or presyncope  He denies palpitations  He denies symptoms of claudication  Problem List     Hypertension    Coronary artery disease    Presence of bare metal stent in right coronary artery    Presence of drug coated stent in left circumflex coronary artery    Dyslipidemia        Past Medical History:   Diagnosis Date    Acute myocardial infarction ([facility] [address] )     Dysuria     Polyp of sigmoid colon     Uncomplicated alcohol abuse      Social History     Socioeconomic History    Marital status:      Spouse name: Not on file    Number of children: Not on file    Years of education: Not on file    Highest education level: Not on file   Occupational History    Not on file   Tobacco Use    Smoking status: Former Smoker     Types: Cigarettes    Smokeless tobacco: Never Used   Vaping Use    Vaping Use: Never used   Substance and Sexual Activity    Alcohol use: No    Drug use: No    Sexual activity: Not on file   Other Topics Concern    Not on file   Social History Narrative    Caffeine use    Currently  per [employer]    Denied: seeing a dentist    Uses safety equipment - seatbelts     Social Determinants of Health     Financial Resource Strain:     Difficulty of Paying Living Expenses:    Food Insecurity:     Worried About [facility] in the Last Year:     [facility] in the Last Year:    Transportation Needs:     Lack of Transportation (Medical):      Lack of Transportation (Non-Medical):    Physical Activity:     Days of Exercise per Week:     Minutes of Exercise per Session:    Stress:     Feeling of Stress :    Social Connections:     Frequency of Communication with Friends and Family:     Frequency of Social Gatherings with Friends and Family:     Attends [employer] Services:     Active Member of Clubs or Organizations:     Attends Club or Organization Meetings:     Marital Status:    Intimate Partner Violence:     Fear of Current or Ex-Partner:     Emotionally Abused:     Physically Abused:     Sexually Abused:       Family History   Problem Relation Age of Onset    Angina Mother     Prostate cancer Father     Angina Maternal Grandmother     Coronary artery disease Brother      Past Surgical History:   Procedure Laterality Date    CORONARY ANGIOPLASTY WITH STENT PLACEMENT      stent to the mid and distal RCA  S/P rick placement to the mid circumflex and proximal second obtuse marginal; Last Assessed: 2/2/2017    EGD AND COLONOSCOPY N/A 7/6/2018    Procedure: EGD AND COLONOSCOPY;  Surgeon: Karthik Garcia MD;  Location: [facility];  Service: Gastroenterology    INGUINAL HERNIA REPAIR         Current Outpatient Medications:     Ascorbic Acid (VITAMIN C) 100 MG tablet, Take by mouth, Disp: , Rfl:     aspirin 81 MG tablet, Take 1 tablet by mouth daily, Disp: , Rfl:     ezetimibe (ZETIA) 10 mg tablet, Take 1 tablet (10 mg total) by mouth daily, Disp: 90 tablet, Rfl: 3    metoprolol succinate (TOPROL-XL) 50 mg 24 hr tablet, Take 1 tablet (50 mg total) by mouth daily, Disp: 90 tablet, Rfl: 3    Multiple Vitamin-Folic Acid TABS, Take by mouth, Disp: , Rfl:     nitroglycerin (NITROSTAT) 0 4 mg SL tablet, DISSOLVE ONE TABLET UNDER THE TONGUE EVERY 5 MINUTES AS NEEDED FOR CHEST PAIN    DO NOT EXCEED A TOTAL OF 3 DOSES IN 15 MINUTES, Disp: 25 tablet, Rfl: 0    tadalafil (CIALIS) 20 MG tablet, Take 1 tablet by mouth, Disp: , Rfl:     rosuvastatin (CRESTOR) 40 MG tablet, Take 1 tablet (40 mg total) by mouth daily, Disp: 90 tablet, Rfl: 3  Allergies   Allergen Reactions    Wellbutrin Sr  [Bupropion]        Labs:     Chemistry        Component Value Date/Time     12/14/2015 1035    K 4 1 07/27/2021 0632    K 3 7 12/14/2015 1035     07/27/2021 0632     12/14/2015 1035    CO2 29 07/27/2021 0632    CO2 29 3 12/14/2015 1035    BUN 18 07/27/2021 0632    BUN 17 12/14/2015 1035    CREATININE 1 05 07/27/2021 0632    CREATININE 1 13 12/14/2015 1035        Component Value Date/Time    CALCIUM 8 8 07/27/2021 0632    CALCIUM 8 5 12/14/2015 1035    ALKPHOS 76 07/27/2021 0632    ALKPHOS 67 12/14/2015 1035    AST 22 07/27/2021 0632    AST 31 12/14/2015 1035    ALT 38 07/27/2021 0632    ALT 43 12/14/2015 1035    BILITOT 0 54 12/14/2015 1035            Lab Results   Component Value Date    CHOL 138 12/15/2015    CHOL 129 08/13/2015     Lab Results   Component Value Date    HDL 44 07/27/2021    HDL 42 06/30/2020    HDL 39 (L) 11/27/2019     Lab Results   Component Value Date    LDLCALC 81 07/27/2021    [address] 06/30/2020    LDLCALC 83 11/27/2019     Lab Results   Component Value Date    TRIG 125 07/27/2021    TRIG 102 06/30/2020    TRIG 96 11/27/2019     No results found for: CHOLHDL    Imaging: No results found  ECG:  Normal sinus rhythm, normal ECG      Review of Systems   Cardiovascular: Negative for chest pain, claudication, cyanosis, dyspnea on exertion and irregular heartbeat  All other systems reviewed and are negative  Vitals:    09/29/21 1454   BP: 128/78   Pulse:      Vitals:    09/29/21 1424   Weight: 94 3 kg (208 lb)     Height: 5' 9" (175 3 cm)   Body mass index is 30 72 kg/m²      Physical Exam:  General:  Alert and cooperative, appears stated age  HEENT:  PERRLA, EOMI, no scleral icterus, no conjunctival pallor  Neck:  No lymphadenopathy, no thyromegaly, no carotid bruits, no elevated JVP  Heart:  Regular rate and rhythm, normal S1/S2, no S3/S4, no murmur  Lungs:  Clear to auscultation bilaterally   Abdomen:  Soft, non-tender, positive bowel sounds, no rebound or guarding,   no organomegaly   Extremities:  No clubbing, cyanosis or edema   Vascular:  2+ pedal pulses  Skin:  No rashes or lesions on exposed skin  Neurologic:  Cranial nerves II-XII grossly intact without focal deficits

## 2021-12-28 ENCOUNTER — IMMUNIZATIONS (OUTPATIENT)
Dept: FAMILY MEDICINE CLINIC | Facility: HOSPITAL | Age: 61
End: 2021-12-28

## 2021-12-28 DIAGNOSIS — Z23 ENCOUNTER FOR IMMUNIZATION: Primary | ICD-10-CM

## 2021-12-28 PROCEDURE — 0064A COVID-19 MODERNA VACC 0.25 ML BOOSTER: CPT

## 2021-12-28 PROCEDURE — 91306 COVID-19 MODERNA VACC 0.25 ML BOOSTER: CPT

## 2022-01-28 ENCOUNTER — OFFICE VISIT (OUTPATIENT)
Dept: FAMILY MEDICINE CLINIC | Facility: CLINIC | Age: 62
End: 2022-01-28
Payer: COMMERCIAL

## 2022-01-28 VITALS
TEMPERATURE: 98.3 F | RESPIRATION RATE: 18 BRPM | HEIGHT: 69 IN | SYSTOLIC BLOOD PRESSURE: 126 MMHG | HEART RATE: 76 BPM | BODY MASS INDEX: 29.92 KG/M2 | DIASTOLIC BLOOD PRESSURE: 78 MMHG | WEIGHT: 202 LBS

## 2022-01-28 DIAGNOSIS — Z23 ENCOUNTER FOR IMMUNIZATION: ICD-10-CM

## 2022-01-28 DIAGNOSIS — E55.9 VITAMIN D DEFICIENCY: ICD-10-CM

## 2022-01-28 DIAGNOSIS — E78.5 DYSLIPIDEMIA: ICD-10-CM

## 2022-01-28 DIAGNOSIS — K22.70 BARRETT'S ESOPHAGUS WITHOUT DYSPLASIA: ICD-10-CM

## 2022-01-28 DIAGNOSIS — I10 BENIGN ESSENTIAL HYPERTENSION: ICD-10-CM

## 2022-01-28 DIAGNOSIS — I25.10 CORONARY ARTERY DISEASE INVOLVING NATIVE CORONARY ARTERY OF NATIVE HEART WITHOUT ANGINA PECTORIS: Primary | ICD-10-CM

## 2022-01-28 PROCEDURE — 90471 IMMUNIZATION ADMIN: CPT | Performed by: INTERNAL MEDICINE

## 2022-01-28 PROCEDURE — 90682 RIV4 VACC RECOMBINANT DNA IM: CPT | Performed by: INTERNAL MEDICINE

## 2022-01-28 PROCEDURE — 3078F DIAST BP <80 MM HG: CPT | Performed by: INTERNAL MEDICINE

## 2022-01-28 PROCEDURE — 3008F BODY MASS INDEX DOCD: CPT | Performed by: INTERNAL MEDICINE

## 2022-01-28 PROCEDURE — 1036F TOBACCO NON-USER: CPT | Performed by: INTERNAL MEDICINE

## 2022-01-28 PROCEDURE — 99214 OFFICE O/P EST MOD 30 MIN: CPT | Performed by: INTERNAL MEDICINE

## 2022-01-28 PROCEDURE — 3074F SYST BP LT 130 MM HG: CPT | Performed by: INTERNAL MEDICINE

## 2022-01-28 RX ORDER — ROSUVASTATIN CALCIUM 40 MG/1
40 TABLET, COATED ORAL DAILY
Qty: 90 TABLET | Refills: 3 | Status: SHIPPED | OUTPATIENT
Start: 2022-01-28

## 2022-01-28 NOTE — PROGRESS NOTES
Assessment/Plan:         Diagnoses and all orders for this visit:    Coronary artery disease involving native coronary artery of native heart without angina pectoris  -     Comprehensive metabolic panel; Future  Pt will get labs for next visit  He will see Cardiology again in September  He is resuming statin as he did tolerate the crestor samples he had     Dyslipidemia  -     rosuvastatin (CRESTOR) 40 MG tablet; Take 1 tablet (40 mg total) by mouth daily  Pt tolerated recent samples and requested Rx be sent   He will get lipid panel prior to next visit   Encounter for immunization  -     influenza vaccine, quadrivalent, recombinant, PF, 0 5 mL, for patients 18 yr+ (FLUBLOK)  Flu shot today     Galindo's esophagus without dysplasia  Pt denies sxs Colon due 2023 so likely will have repeat egd then     Benign essential hypertension  BP stable on current rx   Lo sodium diet encouraged exercise as pt not dancing much at this time     Vitamin D deficiency  -     Vitamin D 25 hydroxy; Future  Continue vit d supplement       Rto 6 months      Patient ID: Edmond Figueroa is a 59 y o  male  HPI  Pt generally well He would like flu shot today He is boostered for covid No chest pain or sob He did tolerate retry of crestor and wants Rx to continued daily He is not as active since Covid and not dancing presently He is still working fulltime No limiting joint sxs No heartburn or change in bowels       Review of Systems   Constitutional: Negative for activity change, chills, fatigue and fever  HENT: Negative  Eyes: Negative for visual disturbance  Respiratory: Negative for cough and shortness of breath  Cardiovascular: Negative for chest pain, palpitations and leg swelling  Gastrointestinal: Negative for abdominal distention and abdominal pain  Genitourinary: Negative  Musculoskeletal: Negative  Neurological: Negative for dizziness, light-headedness and headaches     Psychiatric/Behavioral: Negative for sleep disturbance  The patient is not nervous/anxious  Past Medical History:   Diagnosis Date    Acute myocardial infarction ([address] )     Dysuria     Polyp of sigmoid colon     Uncomplicated alcohol abuse      Past Surgical History:   Procedure Laterality Date    CORONARY ANGIOPLASTY WITH STENT PLACEMENT      stent to the mid and distal RCA  S/P rick placement to the mid circumflex and proximal second obtuse marginal; Last Assessed: 2/2/2017    EGD AND COLONOSCOPY N/A 7/6/2018    Procedure: EGD AND COLONOSCOPY;  Surgeon: Neyda Small MD;  Location: [facility];  Service: Gastroenterology    INGUINAL HERNIA REPAIR       Social History     Socioeconomic History    Marital status:      Spouse name: Not on file    Number of children: Not on file    Years of education: Not on file    Highest education level: Not on file   Occupational History    Not on file   Tobacco Use    Smoking status: Former Smoker     Types: Cigarettes    Smokeless tobacco: Never Used   Vaping Use    Vaping Use: Never used   Substance and Sexual Activity    Alcohol use: No    Drug use: No    Sexual activity: Not on file   Other Topics Concern    Not on file   Social History Narrative    Caffeine use    Currently  per [employer]    Denied: seeing a dentist    Uses safety equipment - seatbelts     Social Determinants of Health     Financial Resource Strain: Not on file   Food Insecurity: Not on file   Transportation Needs: Not on file   Physical Activity: Not on file   Stress: Not on file   Social Connections: Not on file   Intimate Partner Violence: Not on file   Housing Stability: Not on file     Allergies   Allergen Reactions    Wellbutrin Sr  [Bupropion]      BMI Counseling: Body mass index is 29 83 kg/m²  The BMI is above normal  Nutrition recommendations include consuming healthier snacks, moderation in carbohydrate intake and increasing intake of lean protein   Rationale for BMI follow-up plan is due to patient being overweight or obese  /78   Pulse 76   Temp 98 3 °F (36 8 °C) (Temporal)   Resp 18   Ht 5' 9" (1 753 m)   Wt 91 6 kg (202 lb)   BMI 29 83 kg/m²          Physical Exam  Vitals reviewed  Constitutional:       General: He is not in acute distress  Appearance: Normal appearance  He is not ill-appearing, toxic-appearing or diaphoretic  HENT:      Head: Normocephalic and atraumatic  Right Ear: Tympanic membrane, ear canal and external ear normal  There is no impacted cerumen  Left Ear: Tympanic membrane, ear canal and external ear normal  There is no impacted cerumen  Nose: Nose normal       Mouth/Throat:      Mouth: Mucous membranes are dry  Eyes:      General: No scleral icterus  Extraocular Movements: Extraocular movements intact  Conjunctiva/sclera: Conjunctivae normal       Pupils: Pupils are equal, round, and reactive to light  Cardiovascular:      Rate and Rhythm: Normal rate and regular rhythm  Pulses: Normal pulses  Heart sounds: Normal heart sounds  No murmur heard  Pulmonary:      Effort: Pulmonary effort is normal  No respiratory distress  Breath sounds: Normal breath sounds  No wheezing  Abdominal:      General: Bowel sounds are normal  There is no distension  Palpations: Abdomen is soft  Tenderness: There is no abdominal tenderness  Musculoskeletal:      Cervical back: Normal range of motion and neck supple  No rigidity  Right lower leg: No edema  Left lower leg: No edema  Lymphadenopathy:      Cervical: No cervical adenopathy  Skin:     General: Skin is dry  Coloration: Skin is not jaundiced or pale  Neurological:      General: No focal deficit present  Mental Status: He is alert and oriented to person, place, and time  Mental status is at baseline  Cranial Nerves: No cranial nerve deficit     Psychiatric:         Mood and Affect: Mood normal          Behavior: Behavior normal  Thought Content:  Thought content normal          Judgment: Judgment normal

## 2022-02-26 ENCOUNTER — LAB (OUTPATIENT)
Dept: LAB | Facility: HOSPITAL | Age: 62
End: 2022-02-26
Attending: INTERNAL MEDICINE
Payer: COMMERCIAL

## 2022-02-26 DIAGNOSIS — E55.9 VITAMIN D DEFICIENCY: ICD-10-CM

## 2022-02-26 DIAGNOSIS — I25.10 CORONARY ARTERY DISEASE INVOLVING NATIVE CORONARY ARTERY OF NATIVE HEART WITHOUT ANGINA PECTORIS: ICD-10-CM

## 2022-02-26 DIAGNOSIS — E78.5 DYSLIPIDEMIA: ICD-10-CM

## 2022-02-26 LAB
25(OH)D3 SERPL-MCNC: 40.1 NG/ML (ref 30–100)
ALBUMIN SERPL BCP-MCNC: 4 G/DL (ref 3.5–5)
ALP SERPL-CCNC: 83 U/L (ref 46–116)
ALT SERPL W P-5'-P-CCNC: 35 U/L (ref 12–78)
ANION GAP SERPL CALCULATED.3IONS-SCNC: 7 MMOL/L (ref 4–13)
AST SERPL W P-5'-P-CCNC: 28 U/L (ref 5–45)
BILIRUB SERPL-MCNC: 0.78 MG/DL (ref 0.2–1)
BUN SERPL-MCNC: 18 MG/DL (ref 5–25)
CALCIUM SERPL-MCNC: 8.7 MG/DL (ref 8.3–10.1)
CHLORIDE SERPL-SCNC: 106 MMOL/L (ref 100–108)
CHOLEST SERPL-MCNC: 142 MG/DL
CO2 SERPL-SCNC: 28 MMOL/L (ref 21–32)
CREAT SERPL-MCNC: 1.03 MG/DL (ref 0.6–1.3)
GFR SERPL CREATININE-BSD FRML MDRD: 76 ML/MIN/1.73SQ M
GLUCOSE P FAST SERPL-MCNC: 112 MG/DL (ref 65–99)
HDLC SERPL-MCNC: 49 MG/DL
LDLC SERPL CALC-MCNC: 78 MG/DL (ref 0–100)
POTASSIUM SERPL-SCNC: 4.3 MMOL/L (ref 3.5–5.3)
PROT SERPL-MCNC: 7.2 G/DL (ref 6.4–8.2)
SODIUM SERPL-SCNC: 141 MMOL/L (ref 136–145)
TRIGL SERPL-MCNC: 76 MG/DL

## 2022-02-26 PROCEDURE — 80053 COMPREHEN METABOLIC PANEL: CPT

## 2022-02-26 PROCEDURE — 36415 COLL VENOUS BLD VENIPUNCTURE: CPT

## 2022-02-26 PROCEDURE — 82306 VITAMIN D 25 HYDROXY: CPT

## 2022-02-26 PROCEDURE — 80061 LIPID PANEL: CPT

## 2022-04-18 ENCOUNTER — TELEPHONE (OUTPATIENT)
Dept: GASTROENTEROLOGY | Facility: CLINIC | Age: 62
End: 2022-04-18

## 2022-04-18 NOTE — TELEPHONE ENCOUNTER
Patient returning call, stated he just missed your phone call  Please call back to schedule his colonoscopy  Thank you!

## 2022-04-18 NOTE — TELEPHONE ENCOUNTER
Patients GI provider:  Dr Lele Esposito    Number to return call: [phone]    Reason for call: Pt calling to schedule his colonoscopy       Scheduled procedure/appointment date if applicable: N/A

## 2022-04-20 ENCOUNTER — PREP FOR PROCEDURE (OUTPATIENT)
Dept: GASTROENTEROLOGY | Facility: CLINIC | Age: 62
End: 2022-04-20

## 2022-04-20 DIAGNOSIS — K63.5 POLYP OF COLON, UNSPECIFIED PART OF COLON, UNSPECIFIED TYPE: Primary | ICD-10-CM

## 2022-04-20 NOTE — TELEPHONE ENCOUNTER
Scheduled date of colonoscopy (as of today): 7/15/22  Physician performing colonoscopy: Dr Steven  Location of colonoscopy: [facility]  Bowel prep reviewed with patient: Miralax/Dulcolax  Instructions reviewed with patient by: Reanna/ramon  Clearances: N/A

## 2022-04-21 NOTE — TELEPHONE ENCOUNTER
LMOM for pt - advised that Dr Jas Winn has afternoon availability on current procedure date of 7/15  Will request PM appt for pt  If pt otherwise needs to r/s date entirely, provided direct callback number for pt

## 2022-07-06 ENCOUNTER — TELEPHONE (OUTPATIENT)
Dept: GASTROENTEROLOGY | Facility: CLINIC | Age: 62
End: 2022-07-06

## 2022-07-07 NOTE — PROGRESS NOTES
Cardiology Follow Up    Minal Bryant  1960  [id number]  [facility] [address] [id number]  [phone]    1  Coronary artery disease involving native coronary artery of native heart without angina pectoris     2  Presence of bare metal stent in right coronary artery     3  Presence of drug coated stent in left circumflex coronary artery     4  Benign essential hypertension     5  Dyslipidemia         Discussion/Summary:  Coronary artery disease:  Stable without symptoms of angina  Functional capacity remains very good  Continue aspirin, statin, beta-blocker  No further testing is indicated  Hypertension: controlled on present regimen    Dyslipidemia: last LDL was 78  He states he did stop atorvastatin and switch to rosuvastatin and was able to tolerate this well  However, sometime over the past few months he did switch back to atorvastatin for unclear reasons  He states he re-ordered this today  When he is finished with this prescription he was advised to restart rosuvastatin 40 mg daily  He will continue zetia 10 mg daily  Lifestyle modifications discussed in detail  He will RTO in 6 months with Dr Kristian Head or sooner if necessary  He will call with any     Interval History:   Minal Bryant is a 59 y o  male with coronary artery disease with prior LISA to the circumflex artery and BMS to the right coronary artery, hypertension, dyslipidemia who presents to the office today for routine follow up  Since his last office visit he reports feeling well  He denies any cardiopulmonary complaints  He continues to work a physically demanding job  He also lives independently and is responsible for all of his house/yard work  He is able to do so without any shortness of breath or chest pain/pressure/discomfort  He denies lower extremity edema, orthopnea, and PND  He denies lightheadedness, dizziness, and syncope   He denies palpitations       Medical Problems             Problem List     Benign essential hypertension    Coronary artery disease    Presence of bare metal stent in right coronary artery    Presence of drug coated stent in left circumflex coronary artery    Dyslipidemia    Dermatitis    Erectile dysfunction of non-organic origin    Spondylosis of cervical region without myelopathy or radiculopathy    Varicosities of leg    Polyp of sigmoid colon    Galindo's esophagus without dysplasia    Chest pain    Acute pain of left knee    Encounter for immunization              Past Medical History:   Diagnosis Date    Acute myocardial infarction ([facility] [address] )     Dysuria     Polyp of sigmoid colon     Uncomplicated alcohol abuse      Social History     Socioeconomic History    Marital status:      Spouse name: Not on file    Number of children: Not on file    Years of education: Not on file    Highest education level: Not on file   Occupational History    Not on file   Tobacco Use    Smoking status: Former Smoker     Types: Cigarettes    Smokeless tobacco: Never Used   Vaping Use    Vaping Use: Never used   Substance and Sexual Activity    Alcohol use: No    Drug use: No    Sexual activity: Not on file   Other Topics Concern    Not on file   Social History Narrative    Caffeine use    Currently  per [employer]    Denied: seeing a dentist    Uses safety equipment - seatbelts     Social Determinants of Health     Financial Resource Strain: Not on file   Food Insecurity: Not on file   Transportation Needs: Not on file   Physical Activity: Not on file   Stress: Not on file   Social Connections: Not on file   Intimate Partner Violence: Not on file   Housing Stability: Not on file      Family History   Problem Relation Age of Onset    Angina Mother     Prostate cancer Father     Angina Maternal Grandmother     Coronary artery disease Brother      Past Surgical History:   Procedure Laterality Date    CORONARY ANGIOPLASTY WITH STENT PLACEMENT      stent to the mid and distal RCA  S/P rick placement to the mid circumflex and proximal second obtuse marginal; Last Assessed: 2/2/2017    EGD AND COLONOSCOPY N/A 7/6/2018    Procedure: EGD AND COLONOSCOPY;  Surgeon: Estephanie Win MD;  Location: [facility];  Service: Gastroenterology    INGUINAL HERNIA REPAIR         Current Outpatient Medications:     Ascorbic Acid (VITAMIN C) 100 MG tablet, Take by mouth, Disp: , Rfl:     aspirin 81 MG tablet, Take 1 tablet by mouth daily, Disp: , Rfl:     ezetimibe (ZETIA) 10 mg tablet, Take 1 tablet (10 mg total) by mouth daily, Disp: 90 tablet, Rfl: 3    metoprolol succinate (TOPROL-XL) 50 mg 24 hr tablet, Take 1 tablet (50 mg total) by mouth daily, Disp: 90 tablet, Rfl: 3    Multiple Vitamin-Folic Acid TABS, Take by mouth, Disp: , Rfl:     nitroglycerin (NITROSTAT) 0 4 mg SL tablet, DISSOLVE ONE TABLET UNDER THE TONGUE EVERY 5 MINUTES AS NEEDED FOR CHEST PAIN    DO NOT EXCEED A TOTAL OF 3 DOSES IN 15 MINUTES, Disp: 25 tablet, Rfl: 0    rosuvastatin (CRESTOR) 40 MG tablet, Take 1 tablet (40 mg total) by mouth daily, Disp: 90 tablet, Rfl: 3    tadalafil (CIALIS) 20 MG tablet, Take 1 tablet by mouth, Disp: , Rfl:   Allergies   Allergen Reactions    Wellbutrin Sr  [Bupropion]        Labs:     Chemistry        Component Value Date/Time     12/14/2015 1035    K 4 3 02/26/2022 0835    K 3 7 12/14/2015 1035     02/26/2022 0835     12/14/2015 1035    CO2 28 02/26/2022 0835    CO2 29 3 12/14/2015 1035    BUN 18 02/26/2022 0835    BUN 17 12/14/2015 1035    CREATININE 1 03 02/26/2022 0835    CREATININE 1 13 12/14/2015 1035        Component Value Date/Time    CALCIUM 8 7 02/26/2022 0835    CALCIUM 8 5 12/14/2015 1035    ALKPHOS 83 02/26/2022 0835    ALKPHOS 67 12/14/2015 1035    AST 28 02/26/2022 0835    AST 31 12/14/2015 1035    ALT 35 02/26/2022 0835    ALT 43 12/14/2015 1035    BILITOT 0 54 12/14/2015 1035            Lab Results   Component Value Date    CHOL 138 12/15/2015    CHOL 129 08/13/2015     Lab Results   Component Value Date    HDL 49 02/26/2022    HDL 44 07/27/2021    HDL 42 06/30/2020     Lab Results   Component Value Date    LDLCALC 78 02/26/2022    LDLCALC 81 07/27/2021    LDLCALC 95 06/30/2020     Lab Results   Component Value Date    TRIG 76 02/26/2022    TRIG 125 07/27/2021    TRIG 102 06/30/2020     No results found for: CHOLHDL    Imaging: No results found  Review of Systems   Cardiovascular: Negative for chest pain, dyspnea on exertion, leg swelling, near-syncope, orthopnea, palpitations, paroxysmal nocturnal dyspnea and syncope  Neurological: Negative for dizziness and light-headedness  All other systems reviewed and are negative  Vitals:    07/08/22 1235   BP: 122/78   Pulse: 64     Vitals:    07/08/22 1235   Weight: 92 5 kg (204 lb)     Height: 5' 9" (175 3 cm)   Body mass index is 30 13 kg/m²  Physical Exam:  Physical Exam  Vitals reviewed  Constitutional:       General: He is not in acute distress  Appearance: He is well-developed  He is obese  He is not diaphoretic  HENT:      Head: Normocephalic and atraumatic  Eyes:      Pupils: Pupils are equal, round, and reactive to light  Neck:      Vascular: No carotid bruit  Cardiovascular:      Rate and Rhythm: Normal rate and regular rhythm  Pulses:           Radial pulses are 2+ on the right side and 2+ on the left side  Dorsalis pedis pulses are 2+ on the right side and 2+ on the left side  Heart sounds: S1 normal and S2 normal  No murmur heard  Pulmonary:      Effort: Pulmonary effort is normal  No respiratory distress  Breath sounds: Normal breath sounds  No wheezing or rales  Abdominal:      General: There is no distension  Palpations: Abdomen is soft  Tenderness: There is no abdominal tenderness  Musculoskeletal:         General: Normal range of motion        Cervical back: Normal range of motion  Right lower leg: No edema  Left lower leg: No edema  Skin:     General: Skin is warm and dry  Findings: No erythema  Neurological:      General: No focal deficit present  Mental Status: He is alert and oriented to person, place, and time     Psychiatric:         Mood and Affect: Mood normal          Behavior: Behavior normal

## 2022-07-08 ENCOUNTER — OFFICE VISIT (OUTPATIENT)
Dept: CARDIOLOGY CLINIC | Facility: HOSPITAL | Age: 62
End: 2022-07-08
Payer: COMMERCIAL

## 2022-07-08 VITALS
HEIGHT: 69 IN | HEART RATE: 64 BPM | DIASTOLIC BLOOD PRESSURE: 78 MMHG | SYSTOLIC BLOOD PRESSURE: 122 MMHG | BODY MASS INDEX: 30.21 KG/M2 | WEIGHT: 204 LBS

## 2022-07-08 DIAGNOSIS — Z95.5 PRESENCE OF DRUG COATED STENT IN LEFT CIRCUMFLEX CORONARY ARTERY: ICD-10-CM

## 2022-07-08 DIAGNOSIS — Z95.5 PRESENCE OF BARE METAL STENT IN RIGHT CORONARY ARTERY: ICD-10-CM

## 2022-07-08 DIAGNOSIS — E78.5 DYSLIPIDEMIA: ICD-10-CM

## 2022-07-08 DIAGNOSIS — I10 BENIGN ESSENTIAL HYPERTENSION: ICD-10-CM

## 2022-07-08 DIAGNOSIS — I25.10 CORONARY ARTERY DISEASE INVOLVING NATIVE CORONARY ARTERY OF NATIVE HEART WITHOUT ANGINA PECTORIS: Primary | ICD-10-CM

## 2022-07-08 PROCEDURE — 99214 OFFICE O/P EST MOD 30 MIN: CPT | Performed by: PHYSICIAN ASSISTANT

## 2022-07-08 PROCEDURE — 3074F SYST BP LT 130 MM HG: CPT | Performed by: PHYSICIAN ASSISTANT

## 2022-07-08 PROCEDURE — 3078F DIAST BP <80 MM HG: CPT | Performed by: PHYSICIAN ASSISTANT

## 2022-07-15 ENCOUNTER — HOSPITAL ENCOUNTER (OUTPATIENT)
Dept: PERIOP | Facility: HOSPITAL | Age: 62
Setting detail: OUTPATIENT SURGERY
Discharge: HOME | End: 2022-07-15
Attending: INTERNAL MEDICINE | Admitting: INTERNAL MEDICINE
Payer: COMMERCIAL

## 2022-07-15 ENCOUNTER — ANESTHESIA EVENT (OUTPATIENT)
Dept: PERIOP | Facility: HOSPITAL | Age: 62
End: 2022-07-15

## 2022-07-15 ENCOUNTER — ANESTHESIA (OUTPATIENT)
Dept: PERIOP | Facility: HOSPITAL | Age: 62
End: 2022-07-15

## 2022-07-15 VITALS
DIASTOLIC BLOOD PRESSURE: 60 MMHG | OXYGEN SATURATION: 98 % | TEMPERATURE: 97 F | SYSTOLIC BLOOD PRESSURE: 104 MMHG | RESPIRATION RATE: 18 BRPM | HEART RATE: 78 BPM

## 2022-07-15 DIAGNOSIS — K63.5 POLYP OF COLON, UNSPECIFIED PART OF COLON, UNSPECIFIED TYPE: ICD-10-CM

## 2022-07-15 PROCEDURE — G0121 COLON CA SCRN NOT HI RSK IND: HCPCS | Performed by: INTERNAL MEDICINE

## 2022-07-15 RX ORDER — PROPOFOL 10 MG/ML
INJECTION, EMULSION INTRAVENOUS AS NEEDED
Status: DISCONTINUED | OUTPATIENT
Start: 2022-07-15 | End: 2022-07-15

## 2022-07-15 RX ORDER — SODIUM CHLORIDE, SODIUM LACTATE, POTASSIUM CHLORIDE, CALCIUM CHLORIDE 600; 310; 30; 20 MG/100ML; MG/100ML; MG/100ML; MG/100ML
125 INJECTION, SOLUTION INTRAVENOUS CONTINUOUS
Status: DISCONTINUED | OUTPATIENT
Start: 2022-07-15 | End: 2022-07-19 | Stop reason: HOSPADM

## 2022-07-15 RX ORDER — SODIUM CHLORIDE, SODIUM LACTATE, POTASSIUM CHLORIDE, CALCIUM CHLORIDE 600; 310; 30; 20 MG/100ML; MG/100ML; MG/100ML; MG/100ML
125 INJECTION, SOLUTION INTRAVENOUS CONTINUOUS
Status: CANCELLED | OUTPATIENT
Start: 2022-07-15

## 2022-07-15 RX ORDER — LIDOCAINE HYDROCHLORIDE 10 MG/ML
0.5 INJECTION, SOLUTION EPIDURAL; INFILTRATION; INTRACAUDAL; PERINEURAL ONCE AS NEEDED
Status: DISCONTINUED | OUTPATIENT
Start: 2022-07-15 | End: 2022-07-19 | Stop reason: HOSPADM

## 2022-07-15 RX ORDER — LIDOCAINE HYDROCHLORIDE 10 MG/ML
0.5 INJECTION, SOLUTION EPIDURAL; INFILTRATION; INTRACAUDAL; PERINEURAL ONCE AS NEEDED
Status: CANCELLED | OUTPATIENT
Start: 2022-07-15

## 2022-07-15 RX ORDER — PROPOFOL 10 MG/ML
INJECTION, EMULSION INTRAVENOUS CONTINUOUS PRN
Status: DISCONTINUED | OUTPATIENT
Start: 2022-07-15 | End: 2022-07-15

## 2022-07-15 RX ADMIN — PROPOFOL 100 MCG/KG/MIN: 10 INJECTION, EMULSION INTRAVENOUS at 11:40

## 2022-07-15 RX ADMIN — PROPOFOL 100 MG: 10 INJECTION, EMULSION INTRAVENOUS at 11:39

## 2022-07-15 RX ADMIN — SODIUM CHLORIDE, SODIUM LACTATE, POTASSIUM CHLORIDE, AND CALCIUM CHLORIDE 125 ML/HR: .6; .31; .03; .02 INJECTION, SOLUTION INTRAVENOUS at 10:14

## 2022-07-15 NOTE — ANESTHESIA PREPROCEDURE EVALUATION
Procedure:  COLONOSCOPY    Relevant Problems   CARDIO   (+) Benign essential hypertension   (+) Chest pain   (+) Coronary artery disease      MUSCULOSKELETAL   (+) Spondylosis of cervical region without myelopathy or radiculopathy      Lab Results   Component Value Date    SODIUM 141 02/26/2022    K 4 3 02/26/2022    BUN 18 02/26/2022    CREATININE 1 03 02/26/2022    EGFR 76 02/26/2022    GLUCOSE 137 12/14/2015     Lab Results   Component Value Date    HGBA1C 5 7 (H) 12/15/2015       Lab Results   Component Value Date    HGB 14 8 11/16/2020    HGB 14 5 11/27/2019    HGB 14 0 12/15/2015     (L) 11/16/2020     (L) 11/27/2019     (L) 12/15/2015     Lab Results   Component Value Date    WBC 7 08 11/16/2020       Lab Results   Component Value Date    CREATININE 1 03 02/26/2022    CREATININE 1 05 07/27/2021    CREATININE 1 03 11/16/2020       Lab Results   Component Value Date    INR 1 02 11/16/2020     Lab Results   Component Value Date    PTT 28 11/16/2020       No results found for: LACTATE                    Physical Exam    Airway    Mallampati score: III  TM Distance: >3 FB       Dental       Cardiovascular      Pulmonary      Other Findings        Anesthesia Plan  ASA Score- 3     Anesthesia Type- IV sedation with anesthesia with ASA Monitors  Additional Monitors:   Airway Plan:     Comment: GEORGIANA Mario , have personally seen and evaluated the patient prior to anesthetic care  I have reviewed the pre-anesthetic record, and other medical records if appropriate to the anesthetic care  If a CRNA is involved in the case, I have reviewed the CRNA assessment, if present, and agree  Risks/benefits and alternatives discussed with patient including possible PONV, sore throat, and possibility of rare anesthetic and surgical emergencies          Plan Factors-    Chart reviewed  EKG reviewed  Imaging results reviewed  Existing labs reviewed  Patient summary reviewed  Patient instructed to abstain from smoking on day of procedure  Obstructive sleep apnea risk education given perioperatively  Induction-     Postoperative Plan-     Informed Consent- Anesthetic plan and risks discussed with patient  I personally reviewed this patient with the CRNA  Discussed and agreed on the Anesthesia Plan with the CRNA  Calderon Aparicio

## 2022-07-15 NOTE — ANESTHESIA POSTPROCEDURE EVALUATION
Post-Op Assessment Note    CV Status:  Stable       Mental Status:  Arousable, sleepy and lethargic   Hydration Status:  Stable   PONV Controlled:  None   Airway Patency:  Patent      Post Op Vitals Reviewed: Yes      Staff: Anesthesiologist, CRNA         No complications documented      BP   97/62   Temp     Pulse  71   Resp   16   SpO2   98

## 2022-07-15 NOTE — H&P
History and Physical - [facility]  Jose Alberto Liriano 59 y o  male MRN: [id number]                  HPI: Jose Alberto Liriano is a 59y o  year old male who presents for a history of colon polyps  REVIEW OF SYSTEMS: Per the HPI, and otherwise unremarkable  Historical Information   Past Medical History:   Diagnosis Date    Acute myocardial infarction ([facility] [address] )     Dysuria     Polyp of sigmoid colon     Uncomplicated alcohol abuse      Past Surgical History:   Procedure Laterality Date    CORONARY ANGIOPLASTY WITH STENT PLACEMENT      stent to the mid and distal RCA   S/P rick placement to the mid circumflex and proximal second obtuse marginal; Last Assessed: 2/2/2017    EGD AND COLONOSCOPY N/A 7/6/2018    Procedure: EGD AND COLONOSCOPY;  Surgeon: Eder Kimble MD;  Location: [facility];  Service: Gastroenterology    INGUINAL HERNIA REPAIR       Social History   Social History     Substance and Sexual Activity   Alcohol Use No     Social History     Substance and Sexual Activity   Drug Use No     Social History     Tobacco Use   Smoking Status Former Smoker    Types: Cigarettes   Smokeless Tobacco Never Used     Family History   Problem Relation Age of Onset    Angina Mother     Prostate cancer Father     Angina Maternal Grandmother     Coronary artery disease Brother        Meds/Allergies       Current Outpatient Medications:     aspirin 81 MG tablet    ezetimibe (ZETIA) 10 mg tablet    metoprolol succinate (TOPROL-XL) 50 mg 24 hr tablet    Ascorbic Acid (VITAMIN C) 100 MG tablet    Multiple Vitamin-Folic Acid TABS    nitroglycerin (NITROSTAT) 0 4 mg SL tablet    rosuvastatin (CRESTOR) 40 MG tablet    tadalafil (CIALIS) 20 MG tablet    Current Facility-Administered Medications:     lactated ringers infusion, 125 mL/hr, Intravenous, Continuous, 125 mL/hr at 07/15/22 1014    lidocaine (PF) (XYLOCAINE-MPF) 1 % injection 0 5 mL, 0 5 mL, Infiltration, Once PRN    Allergies   Allergen Reactions    Wellbutrin Sr  [Bupropion]        Objective     /75   Pulse 76   Temp (!) 96 9 °F (36 1 °C) (Tympanic)   Resp 18   SpO2 95%       PHYSICAL EXAM    Gen: NAD  Head: NCAT  CV: RRR  CHEST: Clear  ABD: soft, NT/ND  EXT: no edema      ASSESSMENT/PLAN:  This is a 59y o  year old male here for colonoscopy, and he is stable and optimized for his procedure

## 2022-07-27 ENCOUNTER — TELEPHONE (OUTPATIENT)
Dept: FAMILY MEDICINE CLINIC | Facility: CLINIC | Age: 62
End: 2022-07-27

## 2022-07-27 NOTE — TELEPHONE ENCOUNTER
Pt's friend at work, Mine Almonte, called concerned for pt  Scar Danielsjackie he is experiencing severe memory loss and is worried for his safety due to this since it seems to be progressing  Pt has appt here on 8/5, wanted me to make you aware  Mine Almonte left his number if you needed to speak to him: [phone]

## 2022-07-27 NOTE — TELEPHONE ENCOUNTER
Felipe Carrillo will assess at visit - we cannot give info to his friend or speak with him unless the patient gives permission/adds him to contact list

## 2022-08-05 ENCOUNTER — OFFICE VISIT (OUTPATIENT)
Dept: FAMILY MEDICINE CLINIC | Facility: CLINIC | Age: 62
End: 2022-08-05
Payer: COMMERCIAL

## 2022-08-05 VITALS
SYSTOLIC BLOOD PRESSURE: 128 MMHG | TEMPERATURE: 97.3 F | HEIGHT: 69 IN | HEART RATE: 70 BPM | BODY MASS INDEX: 29.95 KG/M2 | WEIGHT: 202.2 LBS | RESPIRATION RATE: 18 BRPM | DIASTOLIC BLOOD PRESSURE: 76 MMHG

## 2022-08-05 DIAGNOSIS — Z00.00 ANNUAL PHYSICAL EXAM: Primary | ICD-10-CM

## 2022-08-05 DIAGNOSIS — R41.3 MEMORY LOSS: ICD-10-CM

## 2022-08-05 PROCEDURE — 3078F DIAST BP <80 MM HG: CPT | Performed by: INTERNAL MEDICINE

## 2022-08-05 PROCEDURE — 3725F SCREEN DEPRESSION PERFORMED: CPT | Performed by: INTERNAL MEDICINE

## 2022-08-05 PROCEDURE — 99396 PREV VISIT EST AGE 40-64: CPT | Performed by: INTERNAL MEDICINE

## 2022-08-05 PROCEDURE — 3074F SYST BP LT 130 MM HG: CPT | Performed by: INTERNAL MEDICINE

## 2022-08-05 NOTE — PROGRESS NOTES
[facility]    NAME: Francine Kearney  AGE: 57 y o  SEX: male  : 1960     DATE: 2022     Assessment and Plan:     Problem List Items Addressed This Visit    None     Visit Diagnoses     Annual physical exam    -  Primary    Memory loss        Relevant Orders    CBC and differential    Vitamin B12    TSH, 3rd generation    C-reactive protein    C-reactive protein    MRI brain wo contrast      Schedule MRI  Rx for blood work going today  He has eye exam upcoming  Did discuss MVI and b complex daily  Increase hydration  Neurology followup pending results   Followup after testing    Immunizations and preventive care screenings were discussed with patient today  Appropriate education was printed on patient's after visit summary  Counseling:  · Exercise: the importance of regular exercise/physical activity was discussed  Recommend exercise 3-5 times per week for at least 30 minutes  Depression Screening and Follow-up Plan: Patient was screened for depression during today's encounter  They screened negative with a PHQ-2 score of 0  No follow-ups on file  Chief Complaint:     Chief Complaint   Patient presents with    Follow-up     6 months    Dementia     Concerned it could be caused by his meds  History of Present Illness:     Adult Annual Physical   Patient here for a comprehensive physical exam  The patient reports problems - Work has noted patient not as intact with recall No errors but has been told he should get checked for memory loss Father had dementia later in life   Diet and Physical Activity  · Diet/Nutrition: well balanced diet  · Exercise: walking        Depression Screening  PHQ-2/9 Depression Screening    Little interest or pleasure in doing things: 0 - not at all  Feeling down, depressed, or hopeless: 0 - not at all  PHQ-2 Score: 0  PHQ-2 Interpretation: Negative depression screen       General Health  · Sleep: gets 4-6 hours of sleep on average  · Hearing: normal - bilateral   · Vision: no vision problems and goes for regular eye exams  · Dental: regular dental visits   Health  · Symptoms include: none     Review of Systems:     Review of Systems   Constitutional: Negative for chills and fever  HENT: Negative  Eyes: Negative for visual disturbance  Has eye exam next week   Respiratory: Negative for cough and shortness of breath  Cardiovascular: Negative  Gastrointestinal: Negative  Genitourinary: Negative  Musculoskeletal: Negative  Neurological: Negative for dizziness, syncope, facial asymmetry, light-headedness, numbness and headaches  Psychiatric/Behavioral: Negative for sleep disturbance  The patient is not nervous/anxious  Past Medical History:     Past Medical History:   Diagnosis Date    Acute myocardial infarction ([facility] [address] [facility]     Dysuria     Polyp of sigmoid colon     Uncomplicated alcohol abuse       Past Surgical History:     Past Surgical History:   Procedure Laterality Date    CORONARY ANGIOPLASTY WITH STENT PLACEMENT      stent to the mid and distal RCA   S/P rick placement to the mid circumflex and proximal second obtuse marginal; Last Assessed: 2/2/2017    EGD AND COLONOSCOPY N/A 7/6/2018    Procedure: EGD AND COLONOSCOPY;  Surgeon: Enedelia Pressley MD;  Location: [facility];  Service: Gastroenterology    INGUINAL HERNIA REPAIR        Family History:     Family History   Problem Relation Age of Onset    Angina Mother     Prostate cancer Father     Angina Maternal Grandmother     Coronary artery disease Brother       Social History:     Social History     Socioeconomic History    Marital status:      Spouse name: None    Number of children: None    Years of education: None    Highest education level: None   Occupational History    None   Tobacco Use    Smoking status: Former Smoker     Types: Cigarettes    Smokeless tobacco: Never Used   Vaping Use    Vaping Use: Never used   Substance and Sexual Activity    Alcohol use: No    Drug use: No    Sexual activity: None   Other Topics Concern    None   Social History Narrative    Caffeine use    Currently  per [employer]    Denied: seeing a dentist    Uses safety equipment - seatbelts     Social Determinants of Health     Financial Resource Strain: Not on file   Food Insecurity: Not on file   Transportation Needs: Not on file   Physical Activity: Not on file   Stress: Not on file   Social Connections: Not on file   Intimate Partner Violence: Not on file   Housing Stability: Not on file      Current Medications:     Current Outpatient Medications   Medication Sig Dispense Refill    aspirin 81 MG tablet Take 1 tablet by mouth daily      ezetimibe (ZETIA) 10 mg tablet Take 1 tablet (10 mg total) by mouth daily 90 tablet 3    metoprolol succinate (TOPROL-XL) 50 mg 24 hr tablet Take 1 tablet (50 mg total) by mouth daily 90 tablet 3    Multiple Vitamin-Folic Acid TABS Take by mouth      nitroglycerin (NITROSTAT) 0 4 mg SL tablet DISSOLVE ONE TABLET UNDER THE TONGUE EVERY 5 MINUTES AS NEEDED FOR CHEST PAIN  DO NOT EXCEED A TOTAL OF 3 DOSES IN 15 MINUTES 25 tablet 0    rosuvastatin (CRESTOR) 40 MG tablet Take 1 tablet (40 mg total) by mouth daily 90 tablet 3    tadalafil (CIALIS) 20 MG tablet Take 1 tablet by mouth      Ascorbic Acid (VITAMIN C) 100 MG tablet Take by mouth (Patient not taking: Reported on 8/5/2022)       No current facility-administered medications for this visit  Allergies: Allergies   Allergen Reactions    Wellbutrin Sr  [Bupropion]       Physical Exam:     /76   Pulse 70   Temp (!) 97 3 °F (36 3 °C) (Temporal)   Resp 18   Ht 5' 9" (1 753 m)   Wt 91 7 kg (202 lb 3 2 oz)   BMI 29 86 kg/m²     Physical Exam  Vitals reviewed  Constitutional:       General: He is not in acute distress  Appearance: Normal appearance   He is not ill-appearing, toxic-appearing or diaphoretic  HENT:      Head: Normocephalic and atraumatic  Right Ear: External ear normal       Left Ear: External ear normal       Nose: Nose normal       Mouth/Throat:      Mouth: Mucous membranes are dry  Eyes:      General: No scleral icterus  Extraocular Movements: Extraocular movements intact  Conjunctiva/sclera: Conjunctivae normal       Pupils: Pupils are equal, round, and reactive to light  Cardiovascular:      Rate and Rhythm: Normal rate and regular rhythm  Pulses: Normal pulses  Heart sounds: Normal heart sounds  No murmur heard  Pulmonary:      Effort: Pulmonary effort is normal  No respiratory distress  Breath sounds: Normal breath sounds  No wheezing  Abdominal:      General: Bowel sounds are normal  There is no distension  Palpations: Abdomen is soft  Tenderness: There is no abdominal tenderness  Musculoskeletal:      Cervical back: Normal range of motion and neck supple  No rigidity  Right lower leg: No edema  Left lower leg: No edema  Lymphadenopathy:      Cervical: No cervical adenopathy  Skin:     General: Skin is dry  Coloration: Skin is not jaundiced or pale  Neurological:      General: No focal deficit present  Mental Status: He is alert and oriented to person, place, and time  Mental status is at baseline  Cranial Nerves: No cranial nerve deficit  Sensory: No sensory deficit  Psychiatric:         Mood and Affect: Mood normal          Behavior: Behavior normal          Thought Content:  Thought content normal          Judgment: Judgment normal           Miriam Rosales, DO  [facility]

## 2022-08-05 NOTE — PATIENT INSTRUCTIONS

## 2022-09-14 ENCOUNTER — TELEPHONE (OUTPATIENT)
Dept: FAMILY MEDICINE CLINIC | Facility: CLINIC | Age: 62
End: 2022-09-14

## 2022-09-14 NOTE — TELEPHONE ENCOUNTER
Patient requested the note so it is up to him if he feels can do the extra hours - he said there was concern about memory/fatigue etc

## 2022-09-14 NOTE — TELEPHONE ENCOUNTER
Pt said at his last visit you had him doing no OT at his job  He is asking if he must still only do the 40 hrs per week or if he can do OT?

## 2022-09-22 ENCOUNTER — TELEPHONE (OUTPATIENT)
Dept: FAMILY MEDICINE CLINIC | Facility: CLINIC | Age: 62
End: 2022-09-22

## 2022-09-22 NOTE — TELEPHONE ENCOUNTER
PT REQUESTING VISIT, HE IS VERY FORGETFUL, WOULD LIKE AN APPT AND POSSIBLE TESTING FOR ALZHEIMER'S   PLEASE ADVISE

## 2022-09-22 NOTE — TELEPHONE ENCOUNTER
Ashleigh or Ralf Brock please call patient - this was discussed at last visit and he has active ;labs and MRI brain to be completed so he should work to have those done soon We also discussed neurology followup and would be ok to refer to Dr Carl Luong but he needs testing for that as well

## 2022-09-23 ENCOUNTER — APPOINTMENT (OUTPATIENT)
Dept: LAB | Facility: HOSPITAL | Age: 62
End: 2022-09-23
Attending: INTERNAL MEDICINE
Payer: COMMERCIAL

## 2022-09-23 DIAGNOSIS — R41.3 MEMORY LOSS: ICD-10-CM

## 2022-09-23 LAB
BASOPHILS # BLD AUTO: 0.02 THOUSANDS/ΜL (ref 0–0.1)
BASOPHILS NFR BLD AUTO: 1 % (ref 0–1)
CRP SERPL QL: <0.5 MG/L
EOSINOPHIL # BLD AUTO: 0.09 THOUSAND/ΜL (ref 0–0.61)
EOSINOPHIL NFR BLD AUTO: 2 % (ref 0–6)
ERYTHROCYTE [DISTWIDTH] IN BLOOD BY AUTOMATED COUNT: 12.9 % (ref 11.6–15.1)
HCT VFR BLD AUTO: 42.8 % (ref 36.5–49.3)
HGB BLD-MCNC: 14.3 G/DL (ref 12–17)
IMM GRANULOCYTES # BLD AUTO: 0.01 THOUSAND/UL (ref 0–0.2)
IMM GRANULOCYTES NFR BLD AUTO: 0 % (ref 0–2)
LYMPHOCYTES # BLD AUTO: 1.11 THOUSANDS/ΜL (ref 0.6–4.47)
LYMPHOCYTES NFR BLD AUTO: 30 % (ref 14–44)
MCH RBC QN AUTO: 29.9 PG (ref 26.8–34.3)
MCHC RBC AUTO-ENTMCNC: 33.4 G/DL (ref 31.4–37.4)
MCV RBC AUTO: 89 FL (ref 82–98)
MONOCYTES # BLD AUTO: 0.37 THOUSAND/ΜL (ref 0.17–1.22)
MONOCYTES NFR BLD AUTO: 10 % (ref 4–12)
NEUTROPHILS # BLD AUTO: 2.15 THOUSANDS/ΜL (ref 1.85–7.62)
NEUTS SEG NFR BLD AUTO: 57 % (ref 43–75)
NRBC BLD AUTO-RTO: 0 /100 WBCS
PLATELET # BLD AUTO: 130 THOUSANDS/UL (ref 149–390)
PMV BLD AUTO: 9 FL (ref 8.9–12.7)
RBC # BLD AUTO: 4.79 MILLION/UL (ref 3.88–5.62)
TSH SERPL DL<=0.05 MIU/L-ACNC: 1.89 UIU/ML (ref 0.45–4.5)
VIT B12 SERPL-MCNC: 528 PG/ML (ref 100–900)
WBC # BLD AUTO: 3.75 THOUSAND/UL (ref 4.31–10.16)

## 2022-09-23 PROCEDURE — 84443 ASSAY THYROID STIM HORMONE: CPT

## 2022-09-23 PROCEDURE — 85025 COMPLETE CBC W/AUTO DIFF WBC: CPT

## 2022-09-23 PROCEDURE — 82607 VITAMIN B-12: CPT

## 2022-09-23 PROCEDURE — 86140 C-REACTIVE PROTEIN: CPT

## 2022-09-23 PROCEDURE — 36415 COLL VENOUS BLD VENIPUNCTURE: CPT

## 2022-09-24 NOTE — TELEPHONE ENCOUNTER
Patient called in to schedule an appointment to be seen for his recent issue forgetting things  He completely forgot he already called regarding this issue  I did remind him of his MRI schedule for 9/30/22  Please call him back

## 2022-09-25 DIAGNOSIS — D69.6 THROMBOCYTOPENIA (HCC): Primary | ICD-10-CM

## 2022-09-25 NOTE — TELEPHONE ENCOUNTER
Please call pt to remind him of the MRI and would go ahead and setup neurology evaluation as well Dr Darshan Denson is closest for pt

## 2022-09-28 ENCOUNTER — TELEPHONE (OUTPATIENT)
Dept: FAMILY MEDICINE CLINIC | Facility: CLINIC | Age: 62
End: 2022-09-28

## 2022-09-28 NOTE — TELEPHONE ENCOUNTER
Today I have to leave early so do not have availability I can write note and would recommend he stay home until his MRI which is scheduled for 9/30   If he has any new symptoms should go to the ER sooner

## 2022-09-28 NOTE — TELEPHONE ENCOUNTER
Anastasia Castellano will be staying home from work the rest of the week  He would like to  his work excuse on Friday

## 2022-09-28 NOTE — TELEPHONE ENCOUNTER
TORY IS REQUESTING A VISIT WITH YOU TODAY  HE WAS AFRAID HE WOULD GET LOST ON HIS WAY TO WORK TODAY, HE TOOK THE REST OF THE WEEK OFF, AND NEEDS A DOCTORS NOTE    PLEASE ADVISE

## 2022-09-30 ENCOUNTER — HOSPITAL ENCOUNTER (OUTPATIENT)
Dept: MRI IMAGING | Facility: HOSPITAL | Age: 62
Discharge: HOME | End: 2022-09-30
Attending: INTERNAL MEDICINE
Payer: COMMERCIAL

## 2022-09-30 DIAGNOSIS — R41.3 MEMORY LOSS: ICD-10-CM

## 2022-09-30 PROCEDURE — 70551 MRI BRAIN STEM W/O DYE: CPT

## 2022-09-30 PROCEDURE — G1004 CDSM NDSC: HCPCS

## 2022-10-10 DIAGNOSIS — R41.3 MEMORY LOSS: Primary | ICD-10-CM

## 2022-10-11 ENCOUNTER — TELEPHONE (OUTPATIENT)
Dept: FAMILY MEDICINE CLINIC | Facility: CLINIC | Age: 62
End: 2022-10-11

## 2022-10-11 NOTE — TELEPHONE ENCOUNTER
If there is any other option - not sure if [place] or [facility] have neuro coming up but he should be seen sooner if possible I know any neuro appt is a challenge

## 2022-10-25 ENCOUNTER — APPOINTMENT (OUTPATIENT)
Dept: LAB | Facility: HOSPITAL | Age: 62
End: 2022-10-25
Payer: COMMERCIAL

## 2022-10-25 ENCOUNTER — CONSULT (OUTPATIENT)
Dept: HEMATOLOGY ONCOLOGY | Facility: CLINIC | Age: 62
End: 2022-10-25
Payer: COMMERCIAL

## 2022-10-25 VITALS
SYSTOLIC BLOOD PRESSURE: 127 MMHG | TEMPERATURE: 97.5 F | DIASTOLIC BLOOD PRESSURE: 72 MMHG | WEIGHT: 201 LBS | BODY MASS INDEX: 29.77 KG/M2 | HEIGHT: 69 IN | HEART RATE: 70 BPM

## 2022-10-25 DIAGNOSIS — D69.6 THROMBOCYTOPENIA (HCC): ICD-10-CM

## 2022-10-25 DIAGNOSIS — D72.819 LEUKOPENIA, UNSPECIFIED TYPE: ICD-10-CM

## 2022-10-25 DIAGNOSIS — D72.819 LEUKOPENIA, UNSPECIFIED TYPE: Primary | ICD-10-CM

## 2022-10-25 LAB
ALBUMIN SERPL BCP-MCNC: 4 G/DL (ref 3.5–5)
ALP SERPL-CCNC: 82 U/L (ref 46–116)
ALT SERPL W P-5'-P-CCNC: 49 U/L (ref 12–78)
ANION GAP SERPL CALCULATED.3IONS-SCNC: 7 MMOL/L (ref 4–13)
AST SERPL W P-5'-P-CCNC: 31 U/L (ref 5–45)
BASOPHILS # BLD AUTO: 0.03 THOUSANDS/ÂΜL (ref 0–0.1)
BASOPHILS NFR BLD AUTO: 1 % (ref 0–1)
BILIRUB SERPL-MCNC: 0.6 MG/DL (ref 0.2–1)
BUN SERPL-MCNC: 19 MG/DL (ref 5–25)
CALCIUM SERPL-MCNC: 9.4 MG/DL (ref 8.3–10.1)
CHLORIDE SERPL-SCNC: 104 MMOL/L (ref 96–108)
CO2 SERPL-SCNC: 30 MMOL/L (ref 21–32)
CREAT SERPL-MCNC: 0.96 MG/DL (ref 0.6–1.3)
EOSINOPHIL # BLD AUTO: 0.15 THOUSAND/ÂΜL (ref 0–0.61)
EOSINOPHIL NFR BLD AUTO: 3 % (ref 0–6)
ERYTHROCYTE [DISTWIDTH] IN BLOOD BY AUTOMATED COUNT: 12.4 % (ref 11.6–15.1)
ERYTHROCYTE [SEDIMENTATION RATE] IN BLOOD: 1 MM/HOUR (ref 0–19)
FOLATE SERPL-MCNC: 18.7 NG/ML (ref 3.1–17.5)
GFR SERPL CREATININE-BSD FRML MDRD: 83 ML/MIN/1.73SQ M
GLUCOSE SERPL-MCNC: 97 MG/DL (ref 65–140)
HCT VFR BLD AUTO: 44.9 % (ref 36.5–49.3)
HGB BLD-MCNC: 14.8 G/DL (ref 12–17)
IMM GRANULOCYTES # BLD AUTO: 0.01 THOUSAND/UL (ref 0–0.2)
IMM GRANULOCYTES NFR BLD AUTO: 0 % (ref 0–2)
LYMPHOCYTES # BLD AUTO: 1.66 THOUSANDS/ÂΜL (ref 0.6–4.47)
LYMPHOCYTES NFR BLD AUTO: 28 % (ref 14–44)
MCH RBC QN AUTO: 29.1 PG (ref 26.8–34.3)
MCHC RBC AUTO-ENTMCNC: 33 G/DL (ref 31.4–37.4)
MCV RBC AUTO: 88 FL (ref 82–98)
MONOCYTES # BLD AUTO: 0.62 THOUSAND/ÂΜL (ref 0.17–1.22)
MONOCYTES NFR BLD AUTO: 11 % (ref 4–12)
NEUTROPHILS # BLD AUTO: 3.43 THOUSANDS/ÂΜL (ref 1.85–7.62)
NEUTS SEG NFR BLD AUTO: 57 % (ref 43–75)
NRBC BLD AUTO-RTO: 0 /100 WBCS
PLATELET # BLD AUTO: 155 THOUSANDS/UL (ref 149–390)
PMV BLD AUTO: 9.3 FL (ref 8.9–12.7)
POTASSIUM SERPL-SCNC: 4.2 MMOL/L (ref 3.5–5.3)
PROT SERPL-MCNC: 7 G/DL (ref 6.4–8.4)
RBC # BLD AUTO: 5.08 MILLION/UL (ref 3.88–5.62)
SODIUM SERPL-SCNC: 141 MMOL/L (ref 135–147)
WBC # BLD AUTO: 5.9 THOUSAND/UL (ref 4.31–10.16)

## 2022-10-25 PROCEDURE — 85025 COMPLETE CBC W/AUTO DIFF WBC: CPT

## 2022-10-25 PROCEDURE — 99204 OFFICE O/P NEW MOD 45 MIN: CPT | Performed by: NURSE PRACTITIONER

## 2022-10-25 PROCEDURE — 36415 COLL VENOUS BLD VENIPUNCTURE: CPT

## 2022-10-25 PROCEDURE — 80053 COMPREHEN METABOLIC PANEL: CPT

## 2022-10-25 PROCEDURE — 85652 RBC SED RATE AUTOMATED: CPT

## 2022-10-25 PROCEDURE — 82746 ASSAY OF FOLIC ACID SERUM: CPT

## 2022-10-25 PROCEDURE — 82525 ASSAY OF COPPER: CPT

## 2022-10-25 NOTE — PROGRESS NOTES
22 [facility] HEMATOLOGY ONCOLOGY [address] [facility]   [address] [facility] [address] [phone]  Hematology Ambulatory Consult  Ian Araya, 1960, [id number]  10/25/2022      Assessment and Plan   1  Thrombocytopenia (HCC)  2  Leukopenia, unspecified type   Patient is a 68-year-old male with a history of an MI in 2015, status post coronary angioplasty with stent placement in 2017, sigmoid polyp and previous uncomplicated alcohol abuse  Patient was noted to have thrombocytopenia and referred to our office for further evaluation  Per chart review patient had a normal CBC until December 15, 2015 noted during hospitalization with his myocardial infarction  There is no repeat CBC between December 2015 and November 2019  Platelet count in November 2019 was 144, relative monocytes 13% otherwise normal CBC  Patient's most recent CBC from 09/23/2022 shows a WBC 3 75, normal hemoglobin at 14 3, MCV 89, platelets 975 otherwise normal differential   Most recent metabolic panel is from February 2022 that was essentially normal, slightly elevated glucose at 112  Workup done by his PCP included B12 normal at 528, C reactive protein normal   He is up-to-date on colonoscopy in July 2022  Patient denies fevers chills drenching night sweats, unexplained weight loss  Overall he is active and able to function without restriction  He denies any problems with bleeding or clotting  He states he has not had a drink since December 1999  We discussed differential diagnoses of thrombocytopenia including nutritional deficiencies verses inflammatory processes verses viral infections verses medications  Patient does not report having any inflammatory or chronic immune conditions  He he no longer drinks alcohol  He considers his diet may be somewhat inadequate as he lives by himself and cooks for himself only  Patient takes a baby aspirin since the MI in 2015   Chronic aspirin use could cause mild thrombocytopenia  ITP is a consideration however it is a diagnosis of exclusion  We will request the following labs and plan to see him in 2 weeks  If all is adequate I will call him with the results and extend the follow-up to 3 months  Patient verbalized understanding and is in agreement with the plan  - Ambulatory Referral to Hematology / Oncology  - CBC and differential; Future  - Comprehensive metabolic panel; Future  - Copper Level; Future  - Sedimentation rate, automated; Future  - Folate; Future    Barrier(s) to care: None  The patient is  able to self care  [facility] [address] [place] [address]    Subjective   No chief complaint on file  Referring provider    DO Nataliia Moya 86,  [address], [address]    History of present illness:  Patient is a 69-year-old male with a history of an MI in 2015, status post coronary angioplasty with stent placement in 2017, sigmoid polyp and previous uncomplicated alcohol abuse  Patient was noted to have thrombocytopenia and referred to our office for further evaluation  10/25/22: clinically stable    Review of Systems   Constitutional: Negative for activity change, appetite change, fatigue, fever and unexpected weight change  Respiratory: Negative for cough and shortness of breath  Cardiovascular: Negative for chest pain and leg swelling  Gastrointestinal: Negative for abdominal pain, constipation, diarrhea and nausea  Endocrine: Negative for cold intolerance and heat intolerance  Musculoskeletal: Negative for arthralgias and myalgias  Skin: Negative  Neurological: Negative for dizziness, weakness and headaches  Hematological: Negative for adenopathy  Does not bruise/bleed easily       Past Medical History:   Diagnosis Date   • Acute myocardial infarction [facility])    • Dysuria    • Polyp of sigmoid colon    • Uncomplicated alcohol abuse      Past Surgical History:   Procedure Laterality Date   • CORONARY ANGIOPLASTY WITH STENT PLACEMENT      stent to the mid and distal RCA   S/P rick placement to the mid circumflex and proximal second obtuse marginal; Last Assessed: 2/2/2017   • EGD AND COLONOSCOPY N/A 7/6/2018    Procedure: EGD AND COLONOSCOPY;  Surgeon: Amiel Cheadle, MD;  Location: [facility] MA[facility];  Service: Gastroenterology   • INGUINAL HERNIA REPAIR       Family History   Problem Relation Age of Onset   • Angina Mother    • Prostate cancer Father    • Angina Maternal Grandmother    • Coronary artery disease Brother      Social History     Socioeconomic History   • Marital status:      Spouse name: None   • Number of children: None   • Years of education: None   • Highest education level: None   Occupational History   • None   Tobacco Use   • Smoking status: Former Smoker     Types: Cigarettes   • Smokeless tobacco: Never Used   Vaping Use   • Vaping Use: Never used   Substance and Sexual Activity   • Alcohol use: No   • Drug use: No   • Sexual activity: None   Other Topics Concern   • None   Social History Narrative    Caffeine use    Currently  per [employer]    Denied: seeing a dentist    Uses safety equipment - seatbelts     Social Determinants of Health     Financial Resource Strain: Not on file   Food Insecurity: Not on file   Transportation Needs: Not on file   Physical Activity: Not on file   Stress: Not on file   Social Connections: Not on file   Intimate Partner Violence: Not on file   Housing Stability: Not on file       Current Outpatient Medications:   •  ezetimibe (ZETIA) 10 mg tablet, Take 1 tablet by mouth once daily, Disp: 90 tablet, Rfl: 3  •  metoprolol succinate (TOPROL-XL) 50 mg 24 hr tablet, Take 1 tablet by mouth once daily, Disp: 90 tablet, Rfl: 3  •  Multiple Vitamin-Folic Acid TABS, Take by mouth, Disp: , Rfl:   •  rosuvastatin (CRESTOR) 40 MG tablet, Take 1 tablet (40 mg total) by mouth daily, Disp: 90 tablet, Rfl: 3  •  tadalafil (CIALIS) 20 MG tablet, Take 1 tablet by mouth, Disp: , Rfl:   •  Ascorbic Acid (VITAMIN C) 100 MG tablet, Take by mouth (Patient not taking: No sig reported), Disp: , Rfl:   •  aspirin 81 MG tablet, Take 1 tablet by mouth daily (Patient not taking: Reported on 10/25/2022), Disp: , Rfl:   •  nitroglycerin (NITROSTAT) 0 4 mg SL tablet, DISSOLVE ONE TABLET UNDER THE TONGUE EVERY 5 MINUTES AS NEEDED FOR CHEST PAIN  DO NOT EXCEED A TOTAL OF 3 DOSES IN 15 MINUTES (Patient not taking: Reported on 10/25/2022), Disp: 25 tablet, Rfl: 0  Allergies   Allergen Reactions   • Wellbutrin Sr  [Bupropion]      Objective   /72   Pulse 70   Temp 97 5 °F (36 4 °C)   Ht 5' 9" (1 753 m)   Wt 91 2 kg (201 lb)   BMI 29 68 kg/m²   Physical Exam  Constitutional:       Appearance: Normal appearance  He is well-developed  HENT:      Head: Normocephalic and atraumatic  Eyes:      Pupils: Pupils are equal, round, and reactive to light  Cardiovascular:      Rate and Rhythm: Normal rate and regular rhythm  Heart sounds: Normal heart sounds  Pulmonary:      Effort: Pulmonary effort is normal  No respiratory distress  Breath sounds: Normal breath sounds  Abdominal:      General: Bowel sounds are normal       Palpations: Abdomen is soft  Musculoskeletal:         General: Normal range of motion  Cervical back: Normal range of motion  Lymphadenopathy:      Cervical: No cervical adenopathy  Skin:     General: Skin is warm and dry  Capillary Refill: Capillary refill takes less than 2 seconds  Neurological:      Mental Status: He is alert and oriented to person, place, and time  Psychiatric:         Behavior: Behavior normal      Result Review  Labs:  No visits with results within 1 Month(s) from this visit     Latest known visit with results is:   Appointment on 09/23/2022   Component Date Value Ref Range Status   • WBC 09/23/2022 3 75 (A) 4 31 - 10 16 Thousand/uL Final   • RBC 09/23/2022 4 79  3 88 - 5 62 Million/uL Final   • Hemoglobin 09/23/2022 14 3  12 0 - 17 0 g/dL Final   • Hematocrit 09/23/2022 42 8  36 5 - 49 3 % Final   • MCV 09/23/2022 89  82 - 98 fL Final   • MCH 09/23/2022 29 9  26 8 - 34 3 pg Final   • MCHC 09/23/2022 33 4  31 4 - 37 4 g/dL Final   • RDW 09/23/2022 12 9  11 6 - 15 1 % Final   • MPV 09/23/2022 9 0  8 9 - 12 7 fL Final   • Platelets 28/96/3971 130 (A) 149 - 390 Thousands/uL Final   • nRBC 09/23/2022 0  /100 WBCs Final   • Neutrophils Relative 09/23/2022 57  43 - 75 % Final   • Immat GRANS % 09/23/2022 0  0 - 2 % Final   • Lymphocytes Relative 09/23/2022 30  14 - 44 % Final   • Monocytes Relative 09/23/2022 10  4 - 12 % Final   • Eosinophils Relative 09/23/2022 2  0 - 6 % Final   • Basophils Relative 09/23/2022 1  0 - 1 % Final   • Neutrophils Absolute 09/23/2022 2 15  1 85 - 7 62 Thousands/µL Final   • Immature Grans Absolute 09/23/2022 0 01  0 00 - 0 20 Thousand/uL Final   • Lymphocytes Absolute 09/23/2022 1 11  0 60 - 4 47 Thousands/µL Final   • Monocytes Absolute 09/23/2022 0 37  0 17 - 1 22 Thousand/µL Final   • Eosinophils Absolute 09/23/2022 0 09  0 00 - 0 61 Thousand/µL Final   • Basophils Absolute 09/23/2022 0 02  0 00 - 0 10 Thousands/µL Final   • Vitamin B-12 09/23/2022 528  100 - 900 pg/mL Final   • TSH 3RD GENERATON 09/23/2022 1 887  0 450 - 4 500 uIU/mL Final    Adult TSH (3rd generation) reference range follows the recommended guidelines of the American Thyroid Association, January, 2020  • CRP 09/23/2022 <0 5  <3 0 mg/L Final    3     Please note: This report has been generated by a voice recognition software system  Therefore there may be syntax, spelling, and/or grammatical errors  Please call if you have any questions

## 2022-10-27 LAB — COPPER SERPL-MCNC: 76 UG/DL (ref 69–132)

## 2022-11-18 ENCOUNTER — OFFICE VISIT (OUTPATIENT)
Dept: HEMATOLOGY ONCOLOGY | Facility: CLINIC | Age: 62
End: 2022-11-18

## 2022-11-18 VITALS
BODY MASS INDEX: 30.66 KG/M2 | WEIGHT: 207 LBS | SYSTOLIC BLOOD PRESSURE: 142 MMHG | DIASTOLIC BLOOD PRESSURE: 83 MMHG | HEART RATE: 71 BPM | HEIGHT: 69 IN | TEMPERATURE: 97.4 F

## 2022-11-18 DIAGNOSIS — D69.6 THROMBOCYTOPENIA, UNSPECIFIED (HCC): Primary | ICD-10-CM

## 2022-11-18 DIAGNOSIS — D72.819 LEUKOPENIA, UNSPECIFIED TYPE: ICD-10-CM

## 2022-11-18 PROBLEM — D70.9 NEUTROPENIA (HCC): Status: ACTIVE | Noted: 2022-11-18

## 2022-11-18 PROBLEM — D70.9 NEUTROPENIA (HCC): Status: RESOLVED | Noted: 2022-11-18 | Resolved: 2022-11-18

## 2022-11-18 NOTE — PROGRESS NOTES
22 [facility] HEMATOLOGY ONCOLOGY [address] [facility]   [address] [place] [address]  [phone]  Hematology Ambulatory Follow-Up  Gabi Nurse, 1960, [id number]  11/18/2022    Assessment/Plan:    1  Thrombocytopenia, unspecified ([facility] [address] )  2  Leukopenia, unspecified type   Patient is a 75-year-old male with a history of MI in 2015, PCA with stent placement in 2017, who was initially referred to us for evaluation of thrombocytopenia and leukopenia  Thrombocytopenia was initially noted in 2019 with a platelet count of 930, repeat in November 2020 was 144  September 2022 CBC showed a white blood cell count 3 75, platelets 419 with an otherwise normal CBC and differential   We had requested evaluation including repeat CBC, metabolic panel, nutritional deficiencies  CBC on 10/25/2022 was within normal limits platelets 895  Metabolic panel, sed rate, copper level, vitamin B12 level all within normal limits  Folate was slightly elevated at 18 7 not clinically significant  Patient does not report any bleeding bruising fevers chills weight loss night sweats etc  He continues to work full-time and functions without restriction  Patient had a distant history of excessive alcohol use however has not used alcohol since at least the 1990s  Etiology of thrombocytopenia is unclear however ITP is a consideration  Given patient is asymptomatic observation is appropriate  Patient does not need to follow with our practice I will refer him back to his PCP  We will therefore see him on a p r n  basis, patient verbalized understanding and is in agreement with the plan  Barrier(s) to care: None  The patient is able to self care  [address] [place] [address]    Interval history:  Clinically stable    Review of Systems   Constitutional: Negative for activity change, appetite change, fatigue, fever and unexpected weight change  Respiratory: Negative for cough and shortness of breath  Cardiovascular: Negative for chest pain and leg swelling  Gastrointestinal: Negative for abdominal pain, constipation, diarrhea and nausea  Endocrine: Negative for cold intolerance and heat intolerance  Musculoskeletal: Negative for arthralgias and myalgias  Skin: Negative  Neurological: Negative for dizziness, weakness and headaches  Hematological: Negative for adenopathy  Does not bruise/bleed easily  Past Medical History:   Diagnosis Date   • Acute myocardial infarction [facility])    • Dysuria    • Polyp of sigmoid colon    • Uncomplicated alcohol abuse      Past Surgical History:   Procedure Laterality Date   • CORONARY ANGIOPLASTY WITH STENT PLACEMENT      stent to the mid and distal RCA  S/P rick placement to the mid circumflex and proximal second obtuse marginal; Last Assessed: 2/2/2017   • EGD AND COLONOSCOPY N/A 7/6/2018    Procedure: EGD AND COLONOSCOPY;  Surgeon: Vida Cordova MD;  Location: [facility];  Service: Gastroenterology   • INGUINAL HERNIA REPAIR         Current Outpatient Medications:   •  aspirin 81 MG tablet, Take 1 tablet by mouth daily, Disp: , Rfl:   •  ezetimibe (ZETIA) 10 mg tablet, Take 1 tablet by mouth once daily, Disp: 90 tablet, Rfl: 3  •  metoprolol succinate (TOPROL-XL) 50 mg 24 hr tablet, Take 1 tablet by mouth once daily, Disp: 90 tablet, Rfl: 3  •  Multiple Vitamin-Folic Acid TABS, Take by mouth, Disp: , Rfl:   •  rosuvastatin (CRESTOR) 40 MG tablet, Take 1 tablet (40 mg total) by mouth daily, Disp: 90 tablet, Rfl: 3  •  tadalafil (CIALIS) 20 MG tablet, Take 1 tablet by mouth, Disp: , Rfl:   •  nitroglycerin (NITROSTAT) 0 4 mg SL tablet, DISSOLVE ONE TABLET UNDER THE TONGUE EVERY 5 MINUTES AS NEEDED FOR CHEST PAIN    DO NOT EXCEED A TOTAL OF 3 DOSES IN 15 MINUTES (Patient not taking: Reported on 10/25/2022), Disp: 25 tablet, Rfl: 0    Allergies   Allergen Reactions   • Wellbutrin Sr  [Bupropion] Objective:  /83   Pulse 71   Temp (!) 97 4 °F (36 3 °C)   Ht 5' 9" (1 753 m)   Wt 93 9 kg (207 lb)   BMI 30 57 kg/m²    Physical Exam  Vitals reviewed  Constitutional:       Appearance: Normal appearance  He is well-developed and well-nourished  HENT:      Head: Normocephalic and atraumatic  Eyes:      Extraocular Movements: EOM normal       Pupils: Pupils are equal, round, and reactive to light  Cardiovascular:      Pulses: Intact distal pulses  Pulmonary:      Effort: Pulmonary effort is normal  No respiratory distress  Musculoskeletal:         General: No edema  Normal range of motion  Cervical back: Normal range of motion  Lymphadenopathy:      Cervical: No cervical adenopathy  Skin:     General: Skin is dry  Neurological:      Mental Status: He is alert and oriented to person, place, and time     Psychiatric:         Mood and Affect: Mood and affect normal          Behavior: Behavior normal      Result Review  Labs:  Appointment on 10/25/2022   Component Date Value Ref Range Status   • WBC 10/25/2022 5 90  4 31 - 10 16 Thousand/uL Final   • RBC 10/25/2022 5 08  3 88 - 5 62 Million/uL Final   • Hemoglobin 10/25/2022 14 8  12 0 - 17 0 g/dL Final   • Hematocrit 10/25/2022 44 9  36 5 - 49 3 % Final   • MCV 10/25/2022 88  82 - 98 fL Final   • MCH 10/25/2022 29 1  26 8 - 34 3 pg Final   • MCHC 10/25/2022 33 0  31 4 - 37 4 g/dL Final   • RDW 10/25/2022 12 4  11 6 - 15 1 % Final   • MPV 10/25/2022 9 3  8 9 - 12 7 fL Final   • Platelets 91/59/4524 155  149 - 390 Thousands/uL Final   • nRBC 10/25/2022 0  /100 WBCs Final   • Neutrophils Relative 10/25/2022 57  43 - 75 % Final   • Immat GRANS % 10/25/2022 0  0 - 2 % Final   • Lymphocytes Relative 10/25/2022 28  14 - 44 % Final   • Monocytes Relative 10/25/2022 11  4 - 12 % Final   • Eosinophils Relative 10/25/2022 3  0 - 6 % Final   • Basophils Relative 10/25/2022 1  0 - 1 % Final   • Neutrophils Absolute 10/25/2022 3 43  1 85 - 7 62 Thousands/µL Final   • Immature Grans Absolute 10/25/2022 0 01  0 00 - 0 20 Thousand/uL Final   • Lymphocytes Absolute 10/25/2022 1 66  0 60 - 4 47 Thousands/µL Final   • Monocytes Absolute 10/25/2022 0 62  0 17 - 1 22 Thousand/µL Final   • Eosinophils Absolute 10/25/2022 0 15  0 00 - 0 61 Thousand/µL Final   • Basophils Absolute 10/25/2022 0 03  0 00 - 0 10 Thousands/µL Final   • Sodium 10/25/2022 141  135 - 147 mmol/L Final   • Potassium 10/25/2022 4 2  3 5 - 5 3 mmol/L Final   • Chloride 10/25/2022 104  96 - 108 mmol/L Final   • CO2 10/25/2022 30  21 - 32 mmol/L Final   • ANION GAP 10/25/2022 7  4 - 13 mmol/L Final   • BUN 10/25/2022 19  5 - 25 mg/dL Final   • Creatinine 10/25/2022 0 96  0 60 - 1 30 mg/dL Final    Standardized to IDMS reference method   • Glucose 10/25/2022 97  65 - 140 mg/dL Final    If the patient is fasting, the ADA then defines impaired fasting glucose as > 100 mg/dL and diabetes as > or equal to 123 mg/dL  Specimen collection should occur prior to Sulfasalazine administration due to the potential for falsely depressed results  Specimen collection should occur prior to Sulfapyridine administration due to the potential for falsely elevated results  • Calcium 10/25/2022 9 4  8 3 - 10 1 mg/dL Final   • AST 10/25/2022 31  5 - 45 U/L Final    Specimen collection should occur prior to Sulfasalazine administration due to the potential for falsely depressed results  • ALT 10/25/2022 49  12 - 78 U/L Final    Specimen collection should occur prior to Sulfasalazine administration due to the potential for falsely depressed results  • Alkaline Phosphatase 10/25/2022 82  46 - 116 U/L Final   • Total Protein 10/25/2022 7 0  6 4 - 8 4 g/dL Final   • Albumin 10/25/2022 4 0  3 5 - 5 0 g/dL Final   • Total Bilirubin 10/25/2022 0 60  0 20 - 1 00 mg/dL Final    Use of this assay is not recommended for patients undergoing treatment with eltrombopag due to the potential for falsely elevated results     • eGFR 10/25/2022 83  ml/min/1 73sq m Final   • Copper 10/25/2022 76  69 - 132 ug/dL Final                                    Detection Limit = 5   • Sed Rate 10/25/2022 1  0 - 19 mm/hour Final   • Folate 10/25/2022 18 7 (H)  3 1 - 17 5 ng/mL Final   Appointment on 09/23/2022   Component Date Value Ref Range Status   • WBC 09/23/2022 3 75 (L)  4 31 - 10 16 Thousand/uL Final   • RBC 09/23/2022 4 79  3 88 - 5 62 Million/uL Final   • Hemoglobin 09/23/2022 14 3  12 0 - 17 0 g/dL Final   • Hematocrit 09/23/2022 42 8  36 5 - 49 3 % Final   • MCV 09/23/2022 89  82 - 98 fL Final   • MCH 09/23/2022 29 9  26 8 - 34 3 pg Final   • MCHC 09/23/2022 33 4  31 4 - 37 4 g/dL Final   • RDW 09/23/2022 12 9  11 6 - 15 1 % Final   • MPV 09/23/2022 9 0  8 9 - 12 7 fL Final   • Platelets 25/05/0048 130 (L)  149 - 390 Thousands/uL Final   • nRBC 09/23/2022 0  /100 WBCs Final   • Neutrophils Relative 09/23/2022 57  43 - 75 % Final   • Immat GRANS % 09/23/2022 0  0 - 2 % Final   • Lymphocytes Relative 09/23/2022 30  14 - 44 % Final   • Monocytes Relative 09/23/2022 10  4 - 12 % Final   • Eosinophils Relative 09/23/2022 2  0 - 6 % Final   • Basophils Relative 09/23/2022 1  0 - 1 % Final   • Neutrophils Absolute 09/23/2022 2 15  1 85 - 7 62 Thousands/µL Final   • Immature Grans Absolute 09/23/2022 0 01  0 00 - 0 20 Thousand/uL Final   • Lymphocytes Absolute 09/23/2022 1 11  0 60 - 4 47 Thousands/µL Final   • Monocytes Absolute 09/23/2022 0 37  0 17 - 1 22 Thousand/µL Final   • Eosinophils Absolute 09/23/2022 0 09  0 00 - 0 61 Thousand/µL Final   • Basophils Absolute 09/23/2022 0 02  0 00 - 0 10 Thousands/µL Final   • Vitamin B-12 09/23/2022 528  100 - 900 pg/mL Final   • TSH 3RD GENERATON 09/23/2022 1 887  0 450 - 4 500 uIU/mL Final    Adult TSH (3rd generation) reference range follows the recommended guidelines of the American Thyroid Association, January, 2020  • CRP 09/23/2022 <0 5  <3 0 mg/L Final    3     Please note:   This report has been generated by a voice recognition software system  Therefore there may be syntax, spelling, and/or grammatical errors  Please call if you have any questions

## 2022-11-21 ENCOUNTER — TELEPHONE (OUTPATIENT)
Dept: FAMILY MEDICINE CLINIC | Facility: CLINIC | Age: 62
End: 2022-11-21

## 2022-11-21 NOTE — TELEPHONE ENCOUNTER
Patient is in process of workup for dementia  I see he has appt 12/8 with neurology thru [facility] - can someone call and see if they have sooner appt/cancel maybe this week due to holiday so patient can be seen more readily

## 2022-11-21 NOTE — TELEPHONE ENCOUNTER
PT HAD PLANS TO MEET HIS DAUGHTER IN [place], HE REMEMBERS BEING AT THE HOTEL, BUT MUST HAVE LEFT BEFORE MEETING HIS DAUGHTER, AS SHE CALLED HIM TO ASK WHERE HE WAS  HE ONLY REMEMBERS BITS AND PIECES OF THE DRIVE TO AND FROM  HE IS HOME NOW   PLEASE ADVISE

## 2022-11-25 ENCOUNTER — OFFICE VISIT (OUTPATIENT)
Dept: FAMILY MEDICINE CLINIC | Facility: CLINIC | Age: 62
End: 2022-11-25

## 2022-11-25 VITALS
RESPIRATION RATE: 18 BRPM | TEMPERATURE: 97.7 F | BODY MASS INDEX: 30.36 KG/M2 | SYSTOLIC BLOOD PRESSURE: 134 MMHG | HEIGHT: 69 IN | WEIGHT: 205 LBS | DIASTOLIC BLOOD PRESSURE: 80 MMHG | HEART RATE: 76 BPM

## 2022-11-25 DIAGNOSIS — R41.82 ALTERED MENTAL STATUS, UNSPECIFIED ALTERED MENTAL STATUS TYPE: ICD-10-CM

## 2022-11-25 DIAGNOSIS — F03.B0 MODERATE DEMENTIA, UNSPECIFIED DEMENTIA TYPE, UNSPECIFIED WHETHER BEHAVIORAL, PSYCHOTIC, OR MOOD DISTURBANCE OR ANXIETY: Primary | ICD-10-CM

## 2022-11-25 RX ORDER — DONEPEZIL HYDROCHLORIDE 5 MG/1
5 TABLET, FILM COATED ORAL
Qty: 30 TABLET | Refills: 1 | Status: SHIPPED | OUTPATIENT
Start: 2022-11-25

## 2022-11-25 NOTE — PROGRESS NOTES
Name: Michel Arredondo      : 1960      MRN: [id number]  Encounter Provider: Elfego Bejarano DO  Encounter Date: 2022   Encounter department: [address] [place] [address] Road     1  Moderate dementia, unspecified dementia type, unspecified whether behavioral, psychotic, or mood disturbance or anxiety  -     donepezil (ARICEPT) 5 mg tablet; Take 1 tablet (5 mg total) by mouth daily at bedtime  -     VAS carotid complete study; Future; Expected date: 2022  Pt son will have people check in regularly with patient and have someone go to Neuro appt upcoming   Pt plans to continue working for now as that is structure for him but we did discuss possible st disability if sxs change as driving may be a concern   Stay hydrated   Will consult SS if pt not able to continue to work   Called Neurology for sooner appt but unable to move appt /pt is on cancel list  Work has been [employer] [facility] [employer] [facility], [facility] and his son will ask someone he knows there to monitor his Dad and let him know of any concerns   Increase water intake  Get calendar/day planner for appts and events   Try to schedule calls same day/week so pt expects     2  Altered mental status, unspecified altered mental status type  -     VAS carotid complete study;  Future; Expected date: 2022  Neuro eval upcoming  Pt son will setup carotid doppler     Rto 1 month        Subjective      HPI   Pt has noted memory loss He is with his son today who lives near [facility] Pt son feels in the recent weeks he has been a bit more stable but is concerned and he and his sister have been checking in although at a distance Their Mom lives closer and she is available to pt Pt is still working Work is aware he has been forgetful at times but thus far able to do his job His plan is to retire next Fall if able to continue until then His son feels he has declined since Covid isolated more as he was very active prior to Covid now is home alone most of the time other than work Pt is aware he is forgetful at times Last weekend he thought he was to meet his daughter and she did not show up but that was not true per his son She lives in [place] He was to go on a trip this week with her but cx since not feeling well He does drive mainly back and forth to work He watches TV when not at work He cooks for himself and feels he manages at home ok as does his son for now He sleeps well and same schedule daily No acute sxs NO headaches No vision changes He does not drink much during the day No falls No gait issues NO bowel or bladder issues   Review of Systems   Constitutional: Positive for activity change  Negative for chills and fever  HENT: Negative  Eyes: Negative for visual disturbance  Respiratory: Negative for cough and shortness of breath  Cardiovascular: Negative for chest pain, palpitations and leg swelling  Gastrointestinal: Negative  Genitourinary: Negative  Musculoskeletal: Negative  Neurological: Negative for dizziness, light-headedness and headaches  Psychiatric/Behavioral: Positive for confusion and decreased concentration  Negative for sleep disturbance  The patient is not nervous/anxious  Current Outpatient Medications on File Prior to Visit   Medication Sig   • aspirin 81 MG tablet Take 1 tablet by mouth daily   • ezetimibe (ZETIA) 10 mg tablet Take 1 tablet by mouth once daily   • metoprolol succinate (TOPROL-XL) 50 mg 24 hr tablet Take 1 tablet by mouth once daily   • Multiple Vitamin-Folic Acid TABS Take by mouth   • rosuvastatin (CRESTOR) 40 MG tablet Take 1 tablet (40 mg total) by mouth daily   • tadalafil (CIALIS) 20 MG tablet Take 1 tablet by mouth   • nitroglycerin (NITROSTAT) 0 4 mg SL tablet DISSOLVE ONE TABLET UNDER THE TONGUE EVERY 5 MINUTES AS NEEDED FOR CHEST PAIN    DO NOT EXCEED A TOTAL OF 3 DOSES IN 15 MINUTES (Patient not taking: Reported on 10/25/2022)   • [DISCONTINUED] atorvastatin (LIPITOR) 80 mg tablet Take 1 tablet (80 mg total) by mouth daily       Objective     /80   Pulse 76   Temp 97 7 °F (36 5 °C) (Temporal)   Resp 18   Ht 5' 9" (1 753 m)   Wt 93 kg (205 lb)   BMI 30 27 kg/m²     Physical Exam  Vitals reviewed  Constitutional:       General: He is not in acute distress  Appearance: Normal appearance  He is not ill-appearing, toxic-appearing or diaphoretic  HENT:      Head: Normocephalic and atraumatic  Right Ear: External ear normal       Left Ear: External ear normal       Nose: Nose normal       Mouth/Throat:      Mouth: Mucous membranes are dry  Eyes:      General:         Right eye: No discharge  Extraocular Movements: Extraocular movements intact  Conjunctiva/sclera: Conjunctivae normal       Pupils: Pupils are equal, round, and reactive to light  Cardiovascular:      Rate and Rhythm: Normal rate and regular rhythm  Pulses: Normal pulses  Heart sounds: Normal heart sounds  Pulmonary:      Effort: Pulmonary effort is normal       Breath sounds: Normal breath sounds  Abdominal:      General: Bowel sounds are normal  There is no distension  Palpations: Abdomen is soft  Tenderness: There is no abdominal tenderness  Musculoskeletal:      Cervical back: Normal range of motion and neck supple  No rigidity  Right lower leg: No edema  Left lower leg: No edema  Lymphadenopathy:      Cervical: No cervical adenopathy  Skin:     General: Skin is dry  Coloration: Skin is not jaundiced or pale  Neurological:      General: No focal deficit present  Mental Status: He is alert and oriented to person, place, and time  Mental status is at baseline  Cranial Nerves: No cranial nerve deficit        Comments: Pt responded appropriately to all questions today He is aware he forgets at times but his day to day has been unaffected at this time for the most part    Psychiatric:         Mood and Affect: Mood normal          Behavior: Behavior normal          Thought Content:  Thought content normal          Judgment: Judgment normal        Max Kathleen, DO

## 2022-11-28 ENCOUNTER — HOSPITAL ENCOUNTER (OUTPATIENT)
Dept: NON INVASIVE DIAGNOSTICS | Facility: HOSPITAL | Age: 62
Discharge: HOME | End: 2022-11-28
Attending: INTERNAL MEDICINE

## 2022-11-28 DIAGNOSIS — F03.B0 MODERATE DEMENTIA, UNSPECIFIED DEMENTIA TYPE, UNSPECIFIED WHETHER BEHAVIORAL, PSYCHOTIC, OR MOOD DISTURBANCE OR ANXIETY: ICD-10-CM

## 2022-11-28 DIAGNOSIS — R41.82 ALTERED MENTAL STATUS, UNSPECIFIED ALTERED MENTAL STATUS TYPE: ICD-10-CM

## 2022-12-21 ENCOUNTER — TELEPHONE (OUTPATIENT)
Dept: FAMILY MEDICINE CLINIC | Facility: CLINIC | Age: 62
End: 2022-12-21

## 2022-12-22 ENCOUNTER — RA CDI HCC (OUTPATIENT)
Dept: OTHER | Facility: HOSPITAL | Age: 62
End: 2022-12-22

## 2022-12-22 NOTE — PROGRESS NOTES
[facility] 75  coding opportunities       Chart reviewed, no opportunity found: CHART REVIEWED, NO OPPORTUNITY FOUND        Patients Insurance        Commercial Insurance: [employer]

## 2022-12-30 ENCOUNTER — OFFICE VISIT (OUTPATIENT)
Dept: FAMILY MEDICINE CLINIC | Facility: CLINIC | Age: 62
End: 2022-12-30

## 2022-12-30 VITALS — HEIGHT: 69 IN | BODY MASS INDEX: 29.24 KG/M2 | WEIGHT: 197.4 LBS | TEMPERATURE: 97.8 F

## 2022-12-30 DIAGNOSIS — G31.84 MILD COGNITIVE IMPAIRMENT: Primary | ICD-10-CM

## 2022-12-30 DIAGNOSIS — F03.B0 MODERATE DEMENTIA, UNSPECIFIED DEMENTIA TYPE, UNSPECIFIED WHETHER BEHAVIORAL, PSYCHOTIC, OR MOOD DISTURBANCE OR ANXIETY: ICD-10-CM

## 2022-12-30 DIAGNOSIS — E78.5 DYSLIPIDEMIA: ICD-10-CM

## 2022-12-30 DIAGNOSIS — I10 HYPERTENSION, UNSPECIFIED TYPE: ICD-10-CM

## 2022-12-30 RX ORDER — DONEPEZIL HYDROCHLORIDE 5 MG/1
5 TABLET, FILM COATED ORAL
Qty: 30 TABLET | Refills: 1 | Status: SHIPPED | OUTPATIENT
Start: 2022-12-30

## 2022-12-30 RX ORDER — METOPROLOL SUCCINATE 50 MG/1
50 TABLET, EXTENDED RELEASE ORAL DAILY
Qty: 90 TABLET | Refills: 3 | Status: SHIPPED | OUTPATIENT
Start: 2022-12-30

## 2022-12-30 RX ORDER — EZETIMIBE 10 MG/1
10 TABLET ORAL DAILY
Qty: 90 TABLET | Refills: 3 | Status: SHIPPED | OUTPATIENT
Start: 2022-12-30

## 2022-12-30 NOTE — PROGRESS NOTES
Name: Lyle Bumpers      : 1960      MRN: [id number]  Encounter Provider: Luis A Mora DO  Encounter Date: 2022   Encounter department: [address] [place] [address] Road     1  Mild cognitive impairment  -     Ambulatory Referral to Physical Therapy; Future  Setup cognitive therapy per Neuro recommendations Pt agreeable and will coordinate  Encouraged hi to do the driving eval as well  They are to schedule neuropsyhc eval and I recommended reaching out to Neuro for assistance with that     2  Hypertension, unspecified type  -     metoprolol succinate (TOPROL-XL) 50 mg 24 hr tablet; Take 1 tablet (50 mg total) by mouth daily  Discussed pill box use and pt exwife who is with him seems agreeable to assist with filling weekly   3  Dyslipidemia  -     ezetimibe (ZETIA) 10 mg tablet; Take 1 tablet (10 mg total) by mouth daily  Will check if he has crestor which was rx per cardiology   4  Moderate dementia, unspecified dementia type, unspecified whether behavioral, psychotic, or mood disturbance or anxiety  -     donepezil (ARICEPT) 5 mg tablet; Take 1 tablet (5 mg total) by mouth daily at bedtime  Neuro eval reviewed and they mentioned continuation and possible increase rx     Rto 2 months     Depression Screening and Follow-up Plan: Patient was screened for depression during today's encounter  They screened negative with a PHQ-2 score of 0          Subjective      HPI   Pt presents with his exwife who also went to neuro appt early December with him He seems to be taking the aricept He is still working but she mentioned work has expressed concerns Pt does drive to work Pt does forget meds at times He did not seem to have setup therapy or driving course as rec by Neuro note No falls He is sleeping ok He does get bored when not at work and is aware he is forgetful His exwife does check on him by phone at least and has been going to appts with him     Review of Systems   Constitutional: Negative for chills and fever  HENT: Negative  Eyes: Negative for visual disturbance  Respiratory: Negative for cough and shortness of breath  Cardiovascular: Negative for chest pain, palpitations and leg swelling  Gastrointestinal: Negative  Genitourinary: Negative  Musculoskeletal: Negative  Neurological: Negative for dizziness, light-headedness and headaches  Psychiatric/Behavioral: Positive for decreased concentration  Negative for sleep disturbance  The patient is not nervous/anxious  Current Outpatient Medications on File Prior to Visit   Medication Sig   • aspirin 81 MG tablet Take 1 tablet by mouth daily   • Multiple Vitamin-Folic Acid TABS Take by mouth   • nitroglycerin (NITROSTAT) 0 4 mg SL tablet DISSOLVE ONE TABLET UNDER THE TONGUE EVERY 5 MINUTES AS NEEDED FOR CHEST PAIN  DO NOT EXCEED A TOTAL OF 3 DOSES IN 15 MINUTES   • rosuvastatin (CRESTOR) 40 MG tablet Take 1 tablet (40 mg total) by mouth daily   • [DISCONTINUED] donepezil (ARICEPT) 5 mg tablet Take 1 tablet (5 mg total) by mouth daily at bedtime   • [DISCONTINUED] ezetimibe (ZETIA) 10 mg tablet Take 1 tablet by mouth once daily   • [DISCONTINUED] metoprolol succinate (TOPROL-XL) 50 mg 24 hr tablet Take 1 tablet by mouth once daily   • tadalafil (CIALIS) 20 MG tablet Take 1 tablet by mouth (Patient not taking: Reported on 12/30/2022)   • [DISCONTINUED] atorvastatin (LIPITOR) 80 mg tablet Take 1 tablet (80 mg total) by mouth daily       Objective     Temp 97 8 °F (36 6 °C) (Temporal)   Ht 5' 9" (1 753 m)   Wt 89 5 kg (197 lb 6 4 oz)   BMI 29 15 kg/m²     Physical Exam  Vitals reviewed  Constitutional:       General: He is not in acute distress  Appearance: Normal appearance  He is not ill-appearing, toxic-appearing or diaphoretic  HENT:      Head: Normocephalic and atraumatic        Right Ear: External ear normal       Left Ear: External ear normal       Nose: Nose normal       Mouth/Throat:      Mouth: Mucous membranes are dry  Eyes:      General: No scleral icterus  Extraocular Movements: Extraocular movements intact  Conjunctiva/sclera: Conjunctivae normal       Pupils: Pupils are equal, round, and reactive to light  Cardiovascular:      Rate and Rhythm: Normal rate and regular rhythm  Pulses: Normal pulses  Heart sounds: Normal heart sounds  Pulmonary:      Effort: Pulmonary effort is normal  No respiratory distress  Breath sounds: Normal breath sounds  No wheezing  Abdominal:      General: Bowel sounds are normal  There is no distension  Palpations: Abdomen is soft  Tenderness: There is no abdominal tenderness  Musculoskeletal:      Cervical back: Normal range of motion and neck supple  No rigidity  Right lower leg: No edema  Left lower leg: No edema  Lymphadenopathy:      Cervical: No cervical adenopathy  Skin:     General: Skin is dry  Coloration: Skin is not jaundiced or pale  Neurological:      General: No focal deficit present  Mental Status: He is alert and oriented to person, place, and time  Mental status is at baseline  Psychiatric:         Mood and Affect: Mood normal          Behavior: Behavior normal          Thought Content:  Thought content normal          Judgment: Judgment normal      Pt is aware of his forgetting meds at times but then not sure which meds He does drive to work He does feel he is doing ok at work but he also understands work concerns about safety etc as they have noted he is forgetful at times   Melvi Lehman, DO

## 2023-01-04 ENCOUNTER — TELEPHONE (OUTPATIENT)
Dept: FAMILY MEDICINE CLINIC | Facility: CLINIC | Age: 63
End: 2023-01-04

## 2023-01-04 NOTE — TELEPHONE ENCOUNTER
We had discussed at last visit He would have to get imformation from his Human resources as there would be paperwork to fill out to see if approved

## 2023-01-09 ENCOUNTER — TELEPHONE (OUTPATIENT)
Dept: FAMILY MEDICINE CLINIC | Facility: CLINIC | Age: 63
End: 2023-01-09

## 2023-01-26 ENCOUNTER — OFFICE VISIT (OUTPATIENT)
Dept: FAMILY MEDICINE CLINIC | Facility: CLINIC | Age: 63
End: 2023-01-26

## 2023-01-26 VITALS
DIASTOLIC BLOOD PRESSURE: 78 MMHG | HEIGHT: 69 IN | RESPIRATION RATE: 18 BRPM | SYSTOLIC BLOOD PRESSURE: 124 MMHG | HEART RATE: 78 BPM | WEIGHT: 200.4 LBS | BODY MASS INDEX: 29.68 KG/M2

## 2023-01-26 DIAGNOSIS — I25.10 CORONARY ARTERY DISEASE INVOLVING NATIVE CORONARY ARTERY OF NATIVE HEART WITHOUT ANGINA PECTORIS: ICD-10-CM

## 2023-01-26 DIAGNOSIS — R41.89 COGNITIVE IMPAIRMENT: Primary | ICD-10-CM

## 2023-01-26 DIAGNOSIS — I10 BENIGN ESSENTIAL HYPERTENSION: ICD-10-CM

## 2023-01-26 PROBLEM — R07.9 CHEST PAIN: Status: RESOLVED | Noted: 2020-11-16 | Resolved: 2023-01-26

## 2023-01-26 PROBLEM — M25.562 ACUTE PAIN OF LEFT KNEE: Status: RESOLVED | Noted: 2020-11-16 | Resolved: 2023-01-26

## 2023-01-26 PROBLEM — D69.6 THROMBOCYTOPENIA, UNSPECIFIED (HCC): Status: RESOLVED | Noted: 2022-11-18 | Resolved: 2023-01-26

## 2023-01-26 PROBLEM — D72.819 LEUKOPENIA: Status: RESOLVED | Noted: 2022-11-18 | Resolved: 2023-01-26

## 2023-01-26 PROBLEM — Z23 ENCOUNTER FOR IMMUNIZATION: Status: RESOLVED | Noted: 2021-07-23 | Resolved: 2023-01-26

## 2023-01-26 NOTE — PROGRESS NOTES
Name: Leigha Walters      : 1960      MRN: [id number]  Encounter Provider: Roselie Aase, DO  Encounter Date: 2023   Encounter department: [address] [place] [address] Road     1  Cognitive impairment  Pt has started cognitive therapy and goes at least weekly  He is now on what sounds like str from work as he states he will retire in August  He is driving although I encouraged pt to stay very local and only during the day and will monitor safety of this although thus far no know issues     2  Benign essential hypertension  Bp stable on current rx Pt states he has a system for daily med     3  Coronary artery disease involving native coronary artery of native heart without angina pectoris  Pt knows he has followup with cardiology in April     BMI Counseling: Body mass index is 29 59 kg/m²  The BMI is above normal  Nutrition recommendations include consuming healthier snacks and increasing intake of lean protein  Exercise recommendations include exercising 3-5 times per week  Rationale for BMI follow-up plan is due to patient being overweight or obese  Rto 1 month  Subjective      HPI   Pt came an hour and half early for appt today and is by himself He said he goes to therapy now in [facility] and there are notes for cognitive therapy He said she is off work - sounds like str and will be off until he retires in August He did drive to appt today and is alone He said his exwife is no longer at his house and checks in He feels he is a ittle better since not working He has papers to do for each therapy session and plans to get puzzle books No know concerning issues He feels sleeping better No falls No pain No chest pain or sob  Review of Systems   Constitutional: Negative for chills and fever  HENT: Negative  Eyes: Negative for visual disturbance  Respiratory: Negative for cough and shortness of breath  Cardiovascular: Negative for chest pain, palpitations and leg swelling  Gastrointestinal: Negative for abdominal distention and abdominal pain  Genitourinary: Negative  Musculoskeletal: Positive for arthralgias  Neurological: Negative for dizziness, light-headedness and headaches  Psychiatric/Behavioral: Negative for sleep disturbance  The patient is not nervous/anxious  Current Outpatient Medications on File Prior to Visit   Medication Sig   • aspirin 81 MG tablet Take 1 tablet by mouth daily   • donepezil (ARICEPT) 5 mg tablet Take 1 tablet (5 mg total) by mouth daily at bedtime   • ezetimibe (ZETIA) 10 mg tablet Take 1 tablet (10 mg total) by mouth daily   • metoprolol succinate (TOPROL-XL) 50 mg 24 hr tablet Take 1 tablet (50 mg total) by mouth daily   • Multiple Vitamin-Folic Acid TABS Take by mouth   • nitroglycerin (NITROSTAT) 0 4 mg SL tablet DISSOLVE ONE TABLET UNDER THE TONGUE EVERY 5 MINUTES AS NEEDED FOR CHEST PAIN  DO NOT EXCEED A TOTAL OF 3 DOSES IN 15 MINUTES   • rosuvastatin (CRESTOR) 40 MG tablet Take 1 tablet (40 mg total) by mouth daily   • tadalafil (CIALIS) 20 MG tablet Take 1 tablet by mouth (Patient not taking: Reported on 12/30/2022)   • [DISCONTINUED] atorvastatin (LIPITOR) 80 mg tablet Take 1 tablet (80 mg total) by mouth daily       Objective     /78   Pulse 78   Resp 18   Ht 5' 9" (1 753 m)   Wt 90 9 kg (200 lb 6 4 oz)   BMI 29 59 kg/m²     Physical Exam  Vitals reviewed  Constitutional:       General: He is not in acute distress  Appearance: Normal appearance  He is not ill-appearing, toxic-appearing or diaphoretic  HENT:      Head: Normocephalic and atraumatic  Right Ear: External ear normal       Left Ear: External ear normal       Nose: Nose normal       Mouth/Throat:      Mouth: Mucous membranes are dry  Eyes:      General: No scleral icterus  Extraocular Movements: Extraocular movements intact  Conjunctiva/sclera: Conjunctivae normal       Pupils: Pupils are equal, round, and reactive to light  Cardiovascular:      Rate and Rhythm: Normal rate and regular rhythm  Pulses: Normal pulses  Heart sounds: Normal heart sounds  Pulmonary:      Effort: Pulmonary effort is normal  No respiratory distress  Breath sounds: Normal breath sounds  No wheezing  Abdominal:      General: Bowel sounds are normal  There is no distension  Palpations: Abdomen is soft  Tenderness: There is no abdominal tenderness  Musculoskeletal:      Cervical back: Normal range of motion and neck supple  No rigidity  Right lower leg: No edema  Left lower leg: No edema  Lymphadenopathy:      Cervical: No cervical adenopathy  Skin:     General: Skin is dry  Coloration: Skin is not jaundiced or pale  Neurological:      General: No focal deficit present  Mental Status: He is alert and oriented to person, place, and time  Mental status is at baseline     Psychiatric:         Mood and Affect: Mood normal          Behavior: Behavior normal       Comments: Periods of confusion/forgetful  He is on str per his report at work and can retire in August  He does drive and says his exwife no longer staying with him but checks in     Pt has calendar with appts dates he knew he had appt today but thought 930 He is aware of the date of next month appt and seems more alert today - he said he was at therapy earlier but looks like it was earlier this week   Ayden Cook DO

## 2023-01-27 ENCOUNTER — TELEPHONE (OUTPATIENT)
Dept: FAMILY MEDICINE CLINIC | Facility: CLINIC | Age: 63
End: 2023-01-27

## 2023-01-27 NOTE — TELEPHONE ENCOUNTER
Pt walked in this morning stating he missed over a week of work and whenever he tries to call his job, they hang up on him  I asked if he needs a note for work, and he said he wants someone to call for him and explain why he missed so much work  Please advise

## 2023-01-27 NOTE — TELEPHONE ENCOUNTER
He told me he is on short term disability so not sure he is working?  If we have alternate contact (son or his exwife can someone check with them)

## 2023-01-27 NOTE — TELEPHONE ENCOUNTER
Pt called back, I told him we just filled out disability paperwork for him and he said he forgot we filled it out

## 2023-02-24 ENCOUNTER — TELEPHONE (OUTPATIENT)
Dept: FAMILY MEDICINE CLINIC | Facility: CLINIC | Age: 63
End: 2023-02-24

## 2023-02-24 NOTE — TELEPHONE ENCOUNTER
Patient called sounding very confused  Yessenia Cabrera he has an appt on 2/28 and would like to be seen sooner bc he thinks something is wrong with his mind  Spoke with Dr Melani Ly and nothing is available but he should be evaulated at the ER  Patient aware and will head over

## 2023-02-28 ENCOUNTER — OFFICE VISIT (OUTPATIENT)
Dept: FAMILY MEDICINE CLINIC | Facility: CLINIC | Age: 63
End: 2023-02-28

## 2023-02-28 ENCOUNTER — TELEPHONE (OUTPATIENT)
Dept: FAMILY MEDICINE CLINIC | Facility: CLINIC | Age: 63
End: 2023-02-28

## 2023-02-28 VITALS
BODY MASS INDEX: 28.61 KG/M2 | SYSTOLIC BLOOD PRESSURE: 124 MMHG | DIASTOLIC BLOOD PRESSURE: 78 MMHG | HEART RATE: 78 BPM | HEIGHT: 69 IN | RESPIRATION RATE: 18 BRPM | TEMPERATURE: 97.6 F | WEIGHT: 193.2 LBS

## 2023-02-28 DIAGNOSIS — F03.B0 MODERATE DEMENTIA, UNSPECIFIED DEMENTIA TYPE, UNSPECIFIED WHETHER BEHAVIORAL, PSYCHOTIC, OR MOOD DISTURBANCE OR ANXIETY: Primary | ICD-10-CM

## 2023-02-28 DIAGNOSIS — Z71.89 ENCOUNTER FOR COUNSELING FOR CARE MANAGEMENT OF PATIENT WITH CHRONIC CONDITIONS AND COMPLEX HEALTH NEEDS USING NURSE-BASED MODEL: ICD-10-CM

## 2023-02-28 DIAGNOSIS — G98.8 NEUROLOGIC DISORDER: Primary | ICD-10-CM

## 2023-02-28 DIAGNOSIS — Z95.5 PRESENCE OF BARE METAL STENT IN RIGHT CORONARY ARTERY: ICD-10-CM

## 2023-02-28 DIAGNOSIS — E78.5 DYSLIPIDEMIA: ICD-10-CM

## 2023-02-28 RX ORDER — DONEPEZIL HYDROCHLORIDE 5 MG/1
5 TABLET, FILM COATED ORAL
Qty: 30 TABLET | Refills: 1 | Status: SHIPPED | OUTPATIENT
Start: 2023-02-28 | End: 2023-02-28 | Stop reason: CLARIF

## 2023-02-28 RX ORDER — DONEPEZIL HYDROCHLORIDE 10 MG/1
10 TABLET, FILM COATED ORAL
Qty: 30 TABLET | Refills: 3 | Status: SHIPPED | OUTPATIENT
Start: 2023-02-28

## 2023-02-28 NOTE — TELEPHONE ENCOUNTER
Pt stopped in office, Sima Vigil from his work called and said they need a letter to continue his short term disability  To be faxed to [facility] at [phone]  BMI:   HbA1c: A1C with Estimated Average Glucose Result: 5.0 % (02-27-22 @ 10:45)    Glucose:   BP: 107/66 (03-11-22 @ 06:31) (107/66 - 112/62)  Lipid Panel: Date/Time: 02-27-22 @ 10:45  Cholesterol, Serum: 250  Direct LDL: --  HDL Cholesterol, Serum: 86  Total Cholesterol/HDL Ration Measurement: --  Triglycerides, Serum: 48

## 2023-02-28 NOTE — PROGRESS NOTES
Name: Kim Woods      : 1960      MRN: [id number]  Encounter Provider: Ethan Adams DO  Encounter Date: 2023   Encounter department: [address]     1  Moderate dementia, unspecified dementia type, unspecified whether behavioral, psychotic, or mood disturbance or anxiety  -     donepezil (Aricept) 10 mg tablet; Take 1 tablet (10 mg total) by mouth daily at bedtime  Increase aricept to 10mg daily  Pt has phone number to call Neurology for followup evaluation  Apparently pt is still working and states needs to work thru August He requested light duty note to continue current schedule  He states only drives to and from work   He said his kids call sometimes and encouraged him to schedule at least calls weekly with each of them   2  Presence of bare metal stent in right coronary artery  Pt now aware has cardiology appt in April   No acute issues and has not needed nitro   3  Dyslipidemia  Unclear if patient taking the crestor as prescribed Asked pt to call with other med bottles he has at home so we can clarify as I feel he should stay on statin       Depression Screening and Follow-up Plan: Patient was screened for depression during today's encounter  They screened negative with a PHQ-2 score of 0        Rto 2 month  Subjective      HPI   Pt states he is working FT although last visit I was under the impression he was planning to retire early/take str disability until could retire He states he drives back and forth to work and no issues at work He does need note to continue light duty schedule He does not recall calling here on Friday but is aware he is forgetful He has not followed up with neurology and it does not sound like his exwife is as involved which pt states is better His kids do call him but he said not as often No falls He feels he is managing at home ok on his own He has aricept bottle with him and is taking that daily but needs refill He feels he is eating and sleeping well and on a schedule He s aware he is forgetful at times but feels he can still work and plans to work until August when he will be 63   Review of Systems   Constitutional: Negative for chills and fever  Pt states he is back to work   HENT: Negative  Eyes: Negative for visual disturbance  Respiratory: Negative for cough and shortness of breath  Cardiovascular: Negative for chest pain, palpitations and leg swelling  Gastrointestinal: Negative for abdominal distention and abdominal pain  Genitourinary: Negative  Musculoskeletal: Negative  Psychiatric/Behavioral: Positive for confusion and decreased concentration  Negative for sleep disturbance  The patient is not nervous/anxious  Current Outpatient Medications on File Prior to Visit   Medication Sig   • aspirin 81 MG tablet Take 1 tablet by mouth daily   • ezetimibe (ZETIA) 10 mg tablet Take 1 tablet (10 mg total) by mouth daily   • metoprolol succinate (TOPROL-XL) 50 mg 24 hr tablet Take 1 tablet (50 mg total) by mouth daily   • Multiple Vitamin-Folic Acid TABS Take by mouth   • nitroglycerin (NITROSTAT) 0 4 mg SL tablet DISSOLVE ONE TABLET UNDER THE TONGUE EVERY 5 MINUTES AS NEEDED FOR CHEST PAIN  DO NOT EXCEED A TOTAL OF 3 DOSES IN 15 MINUTES   • [DISCONTINUED] donepezil (ARICEPT) 5 mg tablet Take 1 tablet (5 mg total) by mouth daily at bedtime   • rosuvastatin (CRESTOR) 40 MG tablet Take 1 tablet (40 mg total) by mouth daily   • tadalafil (CIALIS) 20 MG tablet Take 1 tablet by mouth (Patient not taking: Reported on 12/30/2022)   • [DISCONTINUED] atorvastatin (LIPITOR) 80 mg tablet Take 1 tablet (80 mg total) by mouth daily       Objective     /78   Pulse 78   Temp 97 6 °F (36 4 °C) (Temporal)   Resp 18   Ht 5' 9" (1 753 m)   Wt 87 6 kg (193 lb 3 2 oz)   BMI 28 53 kg/m²     Physical Exam  Vitals reviewed  Constitutional:       General: He is not in acute distress       Appearance: Normal appearance  He is not ill-appearing, toxic-appearing or diaphoretic  HENT:      Head: Normocephalic and atraumatic  Right Ear: External ear normal       Left Ear: External ear normal       Nose: Nose normal       Mouth/Throat:      Mouth: Mucous membranes are dry  Eyes:      General: No scleral icterus  Extraocular Movements: Extraocular movements intact  Conjunctiva/sclera: Conjunctivae normal       Pupils: Pupils are equal, round, and reactive to light  Cardiovascular:      Rate and Rhythm: Normal rate and regular rhythm  Pulses: Normal pulses  Heart sounds: Normal heart sounds  No murmur heard  Pulmonary:      Effort: Pulmonary effort is normal  No respiratory distress  Breath sounds: Normal breath sounds  No wheezing  Abdominal:      General: Bowel sounds are normal  There is no distension  Palpations: Abdomen is soft  Tenderness: There is no abdominal tenderness  Musculoskeletal:      Cervical back: Normal range of motion and neck supple  No rigidity  Right lower leg: No edema  Left lower leg: No edema  Lymphadenopathy:      Cervical: No cervical adenopathy  Skin:     General: Skin is dry  Coloration: Skin is not jaundiced or pale  Neurological:      General: No focal deficit present  Mental Status: He is alert and oriented to person, place, and time  Mental status is at baseline  Cranial Nerves: No cranial nerve deficit  Psychiatric:         Mood and Affect: Mood normal          Behavior: Behavior normal          Thought Content:  Thought content normal          Judgment: Judgment normal      Poor recall of dates/time related issues He is able to tell me details related to wok and locations/schedule He is aware day of week and what year we are in and winter He does not recall the neurologist although he has seen in past in [place] He is concerned his exwife is taking things from his house although what he mentioned was for a car issue so perhaps she needed to borrow something He had note for appt today to remember along with note to get a new note for ight duty at work He uses notes for recall often he said He is unaware of any issue with his ability to function at work   Kristi Keen, DO

## 2023-02-28 NOTE — TELEPHONE ENCOUNTER
He wanted to know if you made him an appt with psychology    He made an appt with Dr Maryann Munguia for June 1st   He is questioning if this is ok and what he was suppose to do

## 2023-02-28 NOTE — TELEPHONE ENCOUNTER
He was only to call for followup with Dr Eulalio Peters That is ok as I just increased his medication today  I did not make appt with psychologist - I know Dr Mona Samaniego had mentioned he consider neuropsychologist evaluation but not sure if neurology referred him

## 2023-02-28 NOTE — TELEPHONE ENCOUNTER
I am going to put referral in to [facility], [facility] so we can try to see if any other in home services are available

## 2023-02-28 NOTE — TELEPHONE ENCOUNTER
So does that mean patient is not working? He asked for note today to continue light duty - I had thought since last visit he was on STD but pt told me today he was working?

## 2023-02-28 NOTE — TELEPHONE ENCOUNTER
Karuna Urbandy was in this morning  He asked me to call his employer,  [employer], because he could not remember what they needed from [facility]  He provided the phone number  I called and spoke w Pricila Patel [phone]    Pricila Patel states that they need an approval letter from Dr Julio Cesar Guallpa for Karuna Madrid' continuation of short term disability , which ends TODAY  She also states that they need a copy of the letter to be faxed to [phone] # [id number] [facility] was given to [address] !

## 2023-02-28 NOTE — LETTER
To whom it may concern:    Please continue light duty work schedule        Sincerely,       NATALIO Jack

## 2023-03-06 ENCOUNTER — OFFICE VISIT (OUTPATIENT)
Dept: FAMILY MEDICINE CLINIC | Facility: CLINIC | Age: 63
End: 2023-03-06

## 2023-03-06 VITALS
BODY MASS INDEX: 28.53 KG/M2 | SYSTOLIC BLOOD PRESSURE: 124 MMHG | RESPIRATION RATE: 18 BRPM | HEIGHT: 69 IN | HEART RATE: 76 BPM | TEMPERATURE: 97.2 F | DIASTOLIC BLOOD PRESSURE: 78 MMHG

## 2023-03-06 DIAGNOSIS — F01.B18 MODERATE VASCULAR DEMENTIA WITH OTHER BEHAVIORAL DISTURBANCE: Primary | ICD-10-CM

## 2023-03-06 DIAGNOSIS — I10 BENIGN ESSENTIAL HYPERTENSION: ICD-10-CM

## 2023-03-06 PROBLEM — R41.89 COGNITIVE IMPAIRMENT: Status: RESOLVED | Noted: 2023-01-26 | Resolved: 2023-03-06

## 2023-03-06 PROBLEM — L30.9 DERMATITIS: Status: RESOLVED | Noted: 2017-11-02 | Resolved: 2023-03-06

## 2023-03-06 NOTE — PROGRESS NOTES
Name: Luh Smith      : 1960      MRN: [id number]  Encounter Provider: Sonido Man DO  Encounter Date: 3/6/2023   Encounter department: [address]     1  Moderate vascular dementia with other behavioral disturbance  Pt ex wife is willing to assist with appts and do his meds again She does travel and will be away again in the summer She agrees to be his primary contact   Social service eval for options for in home assistance as I think pt would be ok if he had some further interaction/oversight  They will try to schedule remaining therapy sessions  She will call to see if can see neuro sooner  Stop caffeine - increase water intake  Regular exercise - pt does like to walk if the weather allows  Aricept was increased last visit to 10mg   2   Benign essential hypertension  It sound like pt was taking Metoprolol bid for several days while his ex wife was away  She will resume pill boxes but may benefit from pill packs she does go away at times   Reviewed meds and dosing with both pt and his exwife        April/prn    Subjective      HPI   Pt came today with his ex wife She was away for 3 months and states he needs more help and has declines since she was here She had been doing his pill boxes and said she think she took metoprolol bid since he is almost out f meds (it is once daily rx) Pt aware he is forgetful and today aware he is on disability for work but work is questioning when he will return He has neuro appt in  Pt gets easiy confused with dates and appts and does forget He has 2 calendars which may be part of the confusion No chest pain or sob He does sleep ok He states he is not driving much and his exwife states she can help with appts and shopping now that she is home as she is worried about pt decline His kids are out of the area and not able to be here often She does feel he is ok most of the time at home and can do tasks with direction/reminder/followup so he may do well with in home help   Review of Systems   Constitutional: Negative for chills and fever  HENT: Negative  Respiratory: Negative for cough and shortness of breath  Gastrointestinal: Negative for abdominal distention and abdominal pain  Genitourinary: Negative  Musculoskeletal: Negative  Neurological: Negative for dizziness, light-headedness and headaches  Psychiatric/Behavioral: Negative for sleep disturbance  The patient is not nervous/anxious  Current Outpatient Medications on File Prior to Visit   Medication Sig   • aspirin 81 MG tablet Take 1 tablet by mouth daily   • donepezil (Aricept) 10 mg tablet Take 1 tablet (10 mg total) by mouth daily at bedtime   • ezetimibe (ZETIA) 10 mg tablet Take 1 tablet (10 mg total) by mouth daily   • metoprolol succinate (TOPROL-XL) 50 mg 24 hr tablet Take 1 tablet (50 mg total) by mouth daily   • Multiple Vitamin-Folic Acid TABS Take by mouth   • nitroglycerin (NITROSTAT) 0 4 mg SL tablet DISSOLVE ONE TABLET UNDER THE TONGUE EVERY 5 MINUTES AS NEEDED FOR CHEST PAIN  DO NOT EXCEED A TOTAL OF 3 DOSES IN 15 MINUTES   • rosuvastatin (CRESTOR) 40 MG tablet Take 1 tablet (40 mg total) by mouth daily   • tadalafil (CIALIS) 20 MG tablet Take 1 tablet by mouth (Patient not taking: Reported on 12/30/2022)   • [DISCONTINUED] atorvastatin (LIPITOR) 80 mg tablet Take 1 tablet (80 mg total) by mouth daily       Objective     /78   Pulse 76   Temp (!) 97 2 °F (36 2 °C) (Temporal)   Resp 18   Ht 5' 9" (1 753 m)   BMI 28 53 kg/m²     Physical Exam  Vitals reviewed  Constitutional:       General: He is not in acute distress  Appearance: Normal appearance  He is not ill-appearing, toxic-appearing or diaphoretic  HENT:      Head: Normocephalic and atraumatic  Right Ear: External ear normal       Left Ear: External ear normal       Nose: Nose normal       Mouth/Throat:      Mouth: Mucous membranes are dry     Eyes:      General: No scleral icterus  Extraocular Movements: Extraocular movements intact  Conjunctiva/sclera: Conjunctivae normal       Pupils: Pupils are equal, round, and reactive to light  Neck:      Vascular: No carotid bruit  Cardiovascular:      Rate and Rhythm: Normal rate and regular rhythm  Pulses: Normal pulses  Heart sounds: Normal heart sounds  No murmur heard  Pulmonary:      Effort: Pulmonary effort is normal  No respiratory distress  Breath sounds: Normal breath sounds  No wheezing  Abdominal:      General: Bowel sounds are normal  There is no distension  Palpations: Abdomen is soft  Tenderness: There is no abdominal tenderness  Musculoskeletal:      Cervical back: Normal range of motion and neck supple  No rigidity or tenderness  Right lower leg: No edema  Left lower leg: No edema  Lymphadenopathy:      Cervical: No cervical adenopathy  Skin:     General: Skin is dry  Coloration: Skin is not jaundiced or pale  Neurological:      General: No focal deficit present  Mental Status: He is alert and oriented to person, place, and time  Mental status is at baseline  Cranial Nerves: No cranial nerve deficit  Sensory: No sensory deficit  Psychiatric:         Mood and Affect: Mood normal          Behavior: Behavior normal          Thought Content:  Thought content normal          Judgment: Judgment normal        Lucy Monroy DO

## 2023-03-10 ENCOUNTER — PATIENT OUTREACH (OUTPATIENT)
Dept: FAMILY MEDICINE CLINIC | Facility: CLINIC | Age: 63
End: 2023-03-10

## 2023-03-10 NOTE — PROGRESS NOTES
SAILAJA ROSSI received referral from patient's PCP to offer information on options for help at home for patient  Per chart review, protective services report was filed  SAILAJA ROSSI outreached patient's ex wife to discuss patient's needs  Wife did not answer  SAILAJA ROSSI left a message asking for call to be returned

## 2023-03-14 ENCOUNTER — PATIENT OUTREACH (OUTPATIENT)
Dept: FAMILY MEDICINE CLINIC | Facility: CLINIC | Age: 63
End: 2023-03-14

## 2023-03-14 NOTE — PROGRESS NOTES
SAILAJA ROSSI received vm from [facility] and returned call  Left another message including availability and asked that [facility] return call  Received call from [facility]  She shared that patient's memory is not good  She helps him with his medications  She shared that patient seems to be much better if someone is helping him along  She is concerned that she will be away for two weeks 3/26-4/10 and he will not have help  She said patient is willing to pay for some help if he can afford it  SAILAJA ROSSI will find agencies that offer respite care and get information for [place]  Information will be emailed to: [email or web address]

## 2023-03-16 ENCOUNTER — PATIENT OUTREACH (OUTPATIENT)
Dept: FAMILY MEDICINE CLINIC | Facility: CLINIC | Age: 63
End: 2023-03-16

## 2023-03-16 NOTE — PROGRESS NOTES
SAILAJA ROSSI emailed list of personal care agencies and pricing information for respite care to [facility]  SAILAJA ROSSI will remain available for any needs and f/u next week

## 2023-03-23 ENCOUNTER — PATIENT OUTREACH (OUTPATIENT)
Dept: FAMILY MEDICINE CLINIC | Facility: CLINIC | Age: 63
End: 2023-03-23

## 2023-03-23 NOTE — PROGRESS NOTES
SAILAJA ROSSI outreached Joanie Amos to f/u on personal care agency information emailed to her  Joanie Amos did not answer  SAILAJA ROSSI left a message asking for call to be returned

## 2023-04-27 ENCOUNTER — OFFICE VISIT (OUTPATIENT)
Dept: FAMILY MEDICINE CLINIC | Facility: CLINIC | Age: 63
End: 2023-04-27

## 2023-04-27 VITALS
BODY MASS INDEX: 27.99 KG/M2 | RESPIRATION RATE: 18 BRPM | HEART RATE: 76 BPM | HEIGHT: 69 IN | DIASTOLIC BLOOD PRESSURE: 78 MMHG | SYSTOLIC BLOOD PRESSURE: 128 MMHG | TEMPERATURE: 97.1 F | WEIGHT: 189 LBS

## 2023-04-27 DIAGNOSIS — F02.B0 MODERATE DEMENTIA ASSOCIATED WITH OTHER UNDERLYING DISEASE, WITHOUT BEHAVIORAL DISTURBANCE, PSYCHOTIC DISTURBANCE, MOOD DISTURBANCE, OR ANXIETY (HCC): Primary | ICD-10-CM

## 2023-04-27 DIAGNOSIS — I10 BENIGN ESSENTIAL HYPERTENSION: ICD-10-CM

## 2023-04-27 DIAGNOSIS — I25.10 CORONARY ARTERY DISEASE INVOLVING NATIVE CORONARY ARTERY OF NATIVE HEART WITHOUT ANGINA PECTORIS: ICD-10-CM

## 2023-04-27 DIAGNOSIS — E78.5 DYSLIPIDEMIA: ICD-10-CM

## 2023-04-27 DIAGNOSIS — F03.B0 MODERATE DEMENTIA, UNSPECIFIED DEMENTIA TYPE, UNSPECIFIED WHETHER BEHAVIORAL, PSYCHOTIC, OR MOOD DISTURBANCE OR ANXIETY (HCC): ICD-10-CM

## 2023-04-27 RX ORDER — DONEPEZIL HYDROCHLORIDE 10 MG/1
10 TABLET, FILM COATED ORAL
Qty: 30 TABLET | Refills: 5 | Status: SHIPPED | OUTPATIENT
Start: 2023-04-27

## 2023-04-27 NOTE — PROGRESS NOTES
Name: Xuan Redd      : 1960      MRN: [id number]  Encounter Provider: Jimenez Woodward DO  Encounter Date: 2023   Encounter department: [address]     1  Moderate dementia associated with other underlying disease, without behavioral disturbance, psychotic disturbance, mood disturbance, or anxiety ([facility] [address] )  Pt is more calm and organized today He walked to appt today   He is aware of upcoming neuro appt and will arrange for a ride  His family has been checking in more often  He is planning for Medicare by early September     2  Benign essential hypertension  Stable on current rx    3  Coronary artery disease involving native coronary artery of native heart without angina pectoris  Stable and has been walking almost daily for exercise     4  Moderate dementia, unspecified dementia type, unspecified whether behavioral, psychotic, or mood disturbance or anxiety ([facility])  -     donepezil (Aricept) 10 mg tablet; Take 1 tablet (10 mg total) by mouth daily at bedtime    5  Dyslipidemia  Now on Crestor 40mg daily and has pill boxes   He has been walking for exercise         Depression Screening and Follow-up Plan: Patient was screened for depression during today's encounter  They screened negative with a PHQ-2 score of 0  Rto 2 months    Subjective      HPI   Pt walked to appt today and has been walking as the weather allows almost daily He feels better and is using pill boxes, and a calendar for appts which has helped No chest pain or sob He is sleeping better but still gets up early as if going to work He is trying to keep busy His ex wife checks in and occasionally makes a meal - he usually eats frozen meals that he heats up   He has spoken with medicare and can apply starting next week since 90 days from time he will be eligible   Review of Systems   Constitutional: Negative for chills and fever  HENT: Negative  Eyes: Negative for visual disturbance     Respiratory: "Negative for cough and shortness of breath  Cardiovascular: Negative for chest pain, palpitations and leg swelling  Gastrointestinal: Negative for abdominal distention and abdominal pain  Genitourinary: Negative  Musculoskeletal: Negative  Neurological: Negative for dizziness, light-headedness and headaches  Psychiatric/Behavioral: Negative for sleep disturbance  The patient is not nervous/anxious  Current Outpatient Medications on File Prior to Visit   Medication Sig   • aspirin 81 MG tablet Take 1 tablet by mouth daily   • ezetimibe (ZETIA) 10 mg tablet Take 1 tablet (10 mg total) by mouth daily   • metoprolol succinate (TOPROL-XL) 50 mg 24 hr tablet Take 1 tablet (50 mg total) by mouth daily   • Multiple Vitamin-Folic Acid TABS Take by mouth   • nitroglycerin (NITROSTAT) 0 4 mg SL tablet DISSOLVE ONE TABLET UNDER THE TONGUE EVERY 5 MINUTES AS NEEDED FOR CHEST PAIN  DO NOT EXCEED A TOTAL OF 3 DOSES IN 15 MINUTES   • rosuvastatin (CRESTOR) 40 MG tablet Take 1 tablet (40 mg total) by mouth daily   • [DISCONTINUED] donepezil (Aricept) 10 mg tablet Take 1 tablet (10 mg total) by mouth daily at bedtime   • [DISCONTINUED] atorvastatin (LIPITOR) 80 mg tablet Take 1 tablet (80 mg total) by mouth daily   • [DISCONTINUED] tadalafil (CIALIS) 20 MG tablet Take 1 tablet by mouth (Patient not taking: Reported on 12/30/2022)       Objective     /78   Pulse 76   Temp (!) 97 1 °F (36 2 °C) (Temporal)   Resp 18   Ht 5' 9\" (1 753 m)   Wt 85 7 kg (189 lb)   BMI 27 91 kg/m²     Physical Exam  Vitals reviewed  Constitutional:       General: He is not in acute distress  Appearance: Normal appearance  He is not ill-appearing, toxic-appearing or diaphoretic  HENT:      Head: Normocephalic and atraumatic        Right Ear: External ear normal       Left Ear: External ear normal       Nose: Nose normal       Mouth/Throat:      Mouth: Mucous membranes are moist    Eyes:      General: No scleral " icterus  Extraocular Movements: Extraocular movements intact  Conjunctiva/sclera: Conjunctivae normal       Pupils: Pupils are equal, round, and reactive to light  Cardiovascular:      Rate and Rhythm: Normal rate and regular rhythm  Pulses: Normal pulses  Heart sounds: Normal heart sounds  Pulmonary:      Effort: Pulmonary effort is normal  No respiratory distress  Breath sounds: Normal breath sounds  No wheezing  Abdominal:      General: Bowel sounds are normal  There is no distension  Palpations: Abdomen is soft  Tenderness: There is no abdominal tenderness  Musculoskeletal:      Cervical back: Normal range of motion and neck supple  No rigidity  Right lower leg: No edema  Left lower leg: No edema  Lymphadenopathy:      Cervical: No cervical adenopathy  Skin:     General: Skin is warm and dry  Neurological:      General: No focal deficit present  Mental Status: He is alert and oriented to person, place, and time  Mental status is at baseline  Cranial Nerves: No cranial nerve deficit  Sensory: No sensory deficit  Psychiatric:         Mood and Affect: Mood normal          Behavior: Behavior normal          Thought Content:  Thought content normal          Judgment: Judgment normal        Kaleb Garrett DO

## 2023-04-28 ENCOUNTER — TELEPHONE (OUTPATIENT)
Dept: FAMILY MEDICINE CLINIC | Facility: CLINIC | Age: 63
End: 2023-04-28

## 2023-04-28 NOTE — TELEPHONE ENCOUNTER
Patient states his job needs a note faxed to them stating he can not go to work? Grabiel Krishnamurthy he is retiring Aug 4th  Please advise

## 2023-04-28 NOTE — TELEPHONE ENCOUNTER
Pt's work called asking for note for [employer] short term disability  Asking if we can email her a note like we did previously  Asking to include why patient can not work and when next appointment is with you  Her email is [email or web address]

## 2023-06-20 ENCOUNTER — TELEPHONE (OUTPATIENT)
Dept: PSYCHIATRY | Facility: CLINIC | Age: 63
End: 2023-06-20

## 2023-06-20 NOTE — TELEPHONE ENCOUNTER
Patient has been added to the Medication Management and Talk Therapy wait list without a referral     Insurance: [id number] Type:    Commercial []   Medicaid []   [place] (if applicable)   Medicare [x]  Location Preference: [place]  Provider Preference: None  Virtual: Yes [] No [x]

## 2023-06-22 ENCOUNTER — APPOINTMENT (OUTPATIENT)
Dept: LAB | Facility: MEDICAL CENTER | Age: 63
End: 2023-06-22
Payer: COMMERCIAL

## 2023-06-22 ENCOUNTER — TELEPHONE (OUTPATIENT)
Dept: FAMILY MEDICINE CLINIC | Facility: CLINIC | Age: 63
End: 2023-06-22

## 2023-06-22 DIAGNOSIS — R41.0 CONFUSED: Primary | ICD-10-CM

## 2023-06-22 DIAGNOSIS — R41.0 DISORIENTATION: Primary | ICD-10-CM

## 2023-06-22 DIAGNOSIS — R41.0 CONFUSED: ICD-10-CM

## 2023-06-22 LAB
BACTERIA UR QL AUTO: ABNORMAL /HPF
BILIRUB UR QL STRIP: NEGATIVE
CLARITY UR: CLEAR
COLOR UR: YELLOW
GLUCOSE UR STRIP-MCNC: NEGATIVE MG/DL
HGB UR QL STRIP.AUTO: NEGATIVE
KETONES UR STRIP-MCNC: NEGATIVE MG/DL
LEUKOCYTE ESTERASE UR QL STRIP: NEGATIVE
MUCOUS THREADS UR QL AUTO: ABNORMAL
NITRITE UR QL STRIP: NEGATIVE
NON-SQ EPI CELLS URNS QL MICRO: ABNORMAL /HPF
PH UR STRIP.AUTO: 6.5 [PH]
PROT UR STRIP-MCNC: ABNORMAL MG/DL
RBC #/AREA URNS AUTO: ABNORMAL /HPF
SP GR UR STRIP.AUTO: 1.03 (ref 1–1.03)
UROBILINOGEN UR STRIP-ACNC: <2 MG/DL
WBC #/AREA URNS AUTO: ABNORMAL /HPF

## 2023-06-22 PROCEDURE — 81001 URINALYSIS AUTO W/SCOPE: CPT

## 2023-06-22 NOTE — TELEPHONE ENCOUNTER
Pt's ex wife came into office with pt today with concerns of having a possible UTI  Stated pt has been confused, mood swings, and having very vivid nightmares  Stated was told to come here and would be able to be seen  I directed pt over to care now to be evaluated, she came back over stated they needed an order in chart for UA for pt to be seen  Which I stated to her where the [facility] is so he can be evaluated, not just do a UA  Place UA order

## 2023-06-29 ENCOUNTER — OFFICE VISIT (OUTPATIENT)
Dept: FAMILY MEDICINE CLINIC | Facility: CLINIC | Age: 63
End: 2023-06-29
Payer: COMMERCIAL

## 2023-06-29 VITALS
DIASTOLIC BLOOD PRESSURE: 78 MMHG | HEART RATE: 78 BPM | HEIGHT: 69 IN | WEIGHT: 182.8 LBS | RESPIRATION RATE: 18 BRPM | TEMPERATURE: 97.5 F | BODY MASS INDEX: 27.08 KG/M2 | SYSTOLIC BLOOD PRESSURE: 124 MMHG

## 2023-06-29 DIAGNOSIS — Z95.5 PRESENCE OF BARE METAL STENT IN RIGHT CORONARY ARTERY: ICD-10-CM

## 2023-06-29 DIAGNOSIS — F01.B0 MODERATE VASCULAR DEMENTIA, UNSPECIFIED WHETHER BEHAVIORAL, PSYCHOTIC, OR MOOD DISTURBANCE OR ANXIETY (HCC): Primary | ICD-10-CM

## 2023-06-29 DIAGNOSIS — I10 BENIGN ESSENTIAL HYPERTENSION: ICD-10-CM

## 2023-06-29 PROCEDURE — 99214 OFFICE O/P EST MOD 30 MIN: CPT | Performed by: INTERNAL MEDICINE

## 2023-06-29 NOTE — LETTER
To whom it may concern:        Patient has moderate progressive dementia and is unable to make decision for himself  If further questions, contact the office      NATALIO Vásquez

## 2023-06-29 NOTE — PROGRESS NOTES
Name: Mela Pino      : 1960      MRN: [id number]  Encounter Provider: Abhinav Gonzalez DO  Encounter Date: 2023   Encounter department: [facility] [place] [address]     1  Moderate vascular dementia, unspecified whether behavioral, psychotic, or mood disturbance or anxiety (HCC)  Pt is looking into/applying for assisted living/VA benefits  POA copied for chart His exwife is local and is assisting with transition Pt is agreeable to placement and hopefully will do well since will have more supervised care and socialization  Neuro followup next week     2  Benign essential hypertension  Continue current rx     3  Presence of bare metal stent in right coronary artery  Stable and followed by [address] [place] [address] Cardiology         Depression Screening and Follow-up Plan: Patient was screened for depression during today's encounter  They screened negative with a PHQ-2 score of 0  Rto 3month   Subjective      HPI   Patient and his exwife are here today he does have appt with neurology next week and neuropsych Has had days were he is more confused and unable to assist himself His ex wife has paperwork for assisted living and pt seems agreeable She also is looking into [place] benefits as eventually will need SNF when he declines further   Review of Systems   Constitutional: Negative for chills and fever  HENT: Negative  Eyes: Negative for visual disturbance  Respiratory: Negative for cough and shortness of breath  Cardiovascular: Negative  Gastrointestinal: Negative  Genitourinary: Negative  Musculoskeletal: Negative  Neurological: Negative for dizziness, light-headedness and headaches  Psychiatric/Behavioral: Positive for confusion and decreased concentration  Negative for agitation and sleep disturbance  The patient is not nervous/anxious          Current Outpatient Medications on File Prior to Visit   Medication Sig   • donepezil (Aricept) 10 mg tablet Take 1 tablet (10 mg "total) by mouth daily at bedtime   • ezetimibe (ZETIA) 10 mg tablet Take 1 tablet (10 mg total) by mouth daily   • metoprolol succinate (TOPROL-XL) 50 mg 24 hr tablet Take 1 tablet (50 mg total) by mouth daily   • Multiple Vitamin-Folic Acid TABS Take by mouth   • nitroglycerin (NITROSTAT) 0 4 mg SL tablet DISSOLVE ONE TABLET UNDER THE TONGUE EVERY 5 MINUTES AS NEEDED FOR CHEST PAIN  DO NOT EXCEED A TOTAL OF 3 DOSES IN 15 MINUTES   • rosuvastatin (CRESTOR) 40 MG tablet Take 1 tablet (40 mg total) by mouth daily   • aspirin 81 MG tablet Take 1 tablet by mouth daily (Patient not taking: Reported on 6/29/2023)   • [DISCONTINUED] atorvastatin (LIPITOR) 80 mg tablet Take 1 tablet (80 mg total) by mouth daily       Objective     /78   Pulse 78   Temp 97 5 °F (36 4 °C) (Temporal)   Resp 18   Ht 5' 9\" (1 753 m)   Wt 82 9 kg (182 lb 12 8 oz)   BMI 26 99 kg/m²     Physical Exam  Vitals reviewed  Constitutional:       General: He is not in acute distress  Appearance: Normal appearance  He is not ill-appearing, toxic-appearing or diaphoretic  HENT:      Head: Normocephalic and atraumatic  Right Ear: External ear normal       Left Ear: External ear normal       Nose: Nose normal       Mouth/Throat:      Mouth: Mucous membranes are moist    Eyes:      General: No scleral icterus  Extraocular Movements: Extraocular movements intact  Conjunctiva/sclera: Conjunctivae normal       Pupils: Pupils are equal, round, and reactive to light  Cardiovascular:      Rate and Rhythm: Normal rate and regular rhythm  Pulses: Normal pulses  Heart sounds: Normal heart sounds  Pulmonary:      Effort: Pulmonary effort is normal  No respiratory distress  Breath sounds: Normal breath sounds  No wheezing  Abdominal:      General: Bowel sounds are normal  There is no distension  Palpations: Abdomen is soft  Tenderness: There is no abdominal tenderness     Musculoskeletal:      Cervical " back: Normal range of motion and neck supple  Right lower leg: No edema  Left lower leg: No edema  Lymphadenopathy:      Cervical: No cervical adenopathy  Skin:     General: Skin is warm and dry  Coloration: Skin is not jaundiced or pale  Neurological:      General: No focal deficit present  Mental Status: He is alert and oriented to person, place, and time  Mental status is at baseline  Cranial Nerves: No cranial nerve deficit  Sensory: No sensory deficit  Psychiatric:         Mood and Affect: Mood normal          Behavior: Behavior normal          Thought Content:  Thought content normal          Judgment: Judgment normal        Padmini Salazar, DO

## 2023-09-21 ENCOUNTER — TELEPHONE (OUTPATIENT)
Dept: FAMILY MEDICINE CLINIC | Facility: CLINIC | Age: 63
End: 2023-09-21

## 2023-09-25 NOTE — TELEPHONE ENCOUNTER
09/25/23 10:50 AM        The office's request has been received, reviewed, and the patient chart updated. The PCP has successfully been removed with a patient attribution note. This message will now be completed.         Thank you  Satinder Carcamo

## 2023-10-10 ENCOUNTER — TELEPHONE (OUTPATIENT)
Dept: FAMILY MEDICINE CLINIC | Facility: CLINIC | Age: 63
End: 2023-10-10

## 2023-10-10 DIAGNOSIS — I25.10 CORONARY ARTERY DISEASE INVOLVING NATIVE HEART WITHOUT ANGINA PECTORIS, UNSPECIFIED VESSEL OR LESION TYPE: ICD-10-CM

## 2023-10-10 DIAGNOSIS — E78.5 DYSLIPIDEMIA: ICD-10-CM

## 2023-10-10 DIAGNOSIS — F03.B0 MODERATE DEMENTIA, UNSPECIFIED DEMENTIA TYPE, UNSPECIFIED WHETHER BEHAVIORAL, PSYCHOTIC, OR MOOD DISTURBANCE OR ANXIETY (HCC): Primary | ICD-10-CM

## 2023-10-10 DIAGNOSIS — I10 BENIGN ESSENTIAL HYPERTENSION: ICD-10-CM

## 2023-10-10 RX ORDER — ATORVASTATIN CALCIUM 40 MG/1
40 TABLET, FILM COATED ORAL DAILY
COMMUNITY
Start: 2023-09-28 | End: 2023-10-10 | Stop reason: SDUPTHER

## 2023-10-10 RX ORDER — MEMANTINE HYDROCHLORIDE 5 MG/1
5 TABLET ORAL 2 TIMES DAILY
Qty: 60 TABLET | Refills: 3 | Status: SHIPPED | OUTPATIENT
Start: 2023-10-10

## 2023-10-10 RX ORDER — CHOLECALCIFEROL (VITAMIN D3) 125 MCG
5 CAPSULE ORAL DAILY PRN
COMMUNITY
Start: 2023-09-05 | End: 2023-10-10 | Stop reason: SDUPTHER

## 2023-10-10 RX ORDER — ERGOCALCIFEROL 1.25 MG/1
CAPSULE ORAL WEEKLY
COMMUNITY
Start: 2023-09-20 | End: 2023-10-10 | Stop reason: SDUPTHER

## 2023-10-10 RX ORDER — OLANZAPINE 5 MG/1
2.5 TABLET, ORALLY DISINTEGRATING ORAL
Qty: 30 TABLET | Refills: 5 | Status: SHIPPED | OUTPATIENT
Start: 2023-10-10

## 2023-10-10 RX ORDER — DONEPEZIL HYDROCHLORIDE 10 MG/1
10 TABLET, FILM COATED ORAL
Qty: 90 TABLET | Refills: 3 | Status: SHIPPED | OUTPATIENT
Start: 2023-10-10

## 2023-10-10 RX ORDER — ATORVASTATIN CALCIUM 40 MG/1
40 TABLET, FILM COATED ORAL DAILY
Qty: 90 TABLET | Refills: 3 | Status: SHIPPED | OUTPATIENT
Start: 2023-10-10

## 2023-10-10 RX ORDER — OLANZAPINE 5 MG/1
2.5 TABLET, ORALLY DISINTEGRATING ORAL 3 TIMES DAILY PRN
COMMUNITY
Start: 2023-09-05 | End: 2023-10-10 | Stop reason: SDUPTHER

## 2023-10-10 RX ORDER — MEMANTINE HYDROCHLORIDE 5 MG/1
TABLET ORAL
COMMUNITY
Start: 2023-08-13 | End: 2023-10-10 | Stop reason: SDUPTHER

## 2023-10-10 RX ORDER — METOPROLOL SUCCINATE 25 MG/1
25 TABLET, EXTENDED RELEASE ORAL EVERY 12 HOURS
COMMUNITY
End: 2023-10-10 | Stop reason: SDUPTHER

## 2023-10-10 RX ORDER — EZETIMIBE 10 MG/1
10 TABLET ORAL DAILY
Qty: 90 TABLET | Refills: 3 | Status: SHIPPED | OUTPATIENT
Start: 2023-10-10

## 2023-10-10 RX ORDER — METOPROLOL SUCCINATE 25 MG/1
25 TABLET, EXTENDED RELEASE ORAL EVERY 12 HOURS
Qty: 180 TABLET | Refills: 3 | Status: SHIPPED | OUTPATIENT
Start: 2023-10-10

## 2023-10-10 RX ORDER — ERGOCALCIFEROL 1.25 MG/1
50000 CAPSULE ORAL WEEKLY
Qty: 12 CAPSULE | Refills: 3 | Status: SHIPPED | OUTPATIENT
Start: 2023-10-10

## 2023-10-10 RX ORDER — NITROGLYCERIN 0.4 MG/1
0.4 TABLET SUBLINGUAL
Qty: 25 TABLET | Refills: 2 | Status: SHIPPED | OUTPATIENT
Start: 2023-10-10

## 2023-10-10 RX ORDER — CHOLECALCIFEROL (VITAMIN D3) 125 MCG
5 CAPSULE ORAL DAILY PRN
Qty: 30 TABLET | Refills: 5 | Status: SHIPPED | OUTPATIENT
Start: 2023-10-10

## 2023-10-10 NOTE — TELEPHONE ENCOUNTER
Hitesh Alva came into office asking if we can refill pt's medications. He's currently at [facility] in [place], he's only been there for 1 week so far. Hitesh Alva wanted pt to still see you as his PCP, said she would bring him to and from his appts. I spoke with Susan Villalobos at [facility] and she told me the med refills would have to come from our office. Can I send to you for approval? Hitesh Alva said she would call office to set up appt for pt to see you sometime soon.

## 2024-03-01 ENCOUNTER — TELEPHONE (OUTPATIENT)
Dept: PSYCHIATRY | Facility: CLINIC | Age: 64
End: 2024-03-01

## 2024-03-01 NOTE — TELEPHONE ENCOUNTER
"Contacted patient off of NON-REFERRAL to verify needs of services in attempts to offer patient an appointment at [facility] . Unable to LVM for patient to contact intake dept  in regards to wait list due to \"you call cannot be completed at this time\" after 2x attempt   "

## 2024-04-05 ENCOUNTER — TELEPHONE (OUTPATIENT)
Dept: CARDIOLOGY CLINIC | Facility: HOSPITAL | Age: 64
End: 2024-04-05

## 2024-04-05 NOTE — TELEPHONE ENCOUNTER
Attempted to contact patient to schedule the next appointment with cardiology. A letter has been sent to have patient contact our office.

## (undated) DEVICE — TUBING SUCTION 5MM X 12 FT

## (undated) DEVICE — LUBRICANT SURGILUBE TUBE 4 OZ  FLIP TOP

## (undated) DEVICE — 2000CC GUARDIAN II: Brand: GUARDIAN

## (undated) DEVICE — FORCEPS BIOPSY ALLIGATOR JAW W/NEEDLE 2.8MM

## (undated) DEVICE — CONMED SCOPE SAVER BITE BLOCK, 20X27 MM: Brand: SCOPE SAVER